# Patient Record
Sex: FEMALE | Race: WHITE | Employment: PART TIME | URBAN - METROPOLITAN AREA
[De-identification: names, ages, dates, MRNs, and addresses within clinical notes are randomized per-mention and may not be internally consistent; named-entity substitution may affect disease eponyms.]

---

## 2017-01-12 PROBLEM — M54.2 NECK PAIN: Status: ACTIVE | Noted: 2017-01-12

## 2018-03-13 ENCOUNTER — HOSPITAL ENCOUNTER (OUTPATIENT)
Dept: GENERAL RADIOLOGY | Age: 58
Discharge: HOME | End: 2018-03-15
Payer: COMMERCIAL

## 2018-03-13 ENCOUNTER — HOSPITAL ENCOUNTER (OUTPATIENT)
Age: 58
End: 2018-03-13
Payer: COMMERCIAL

## 2018-03-13 DIAGNOSIS — M54.2 NECK PAIN: ICD-10-CM

## 2018-03-13 PROCEDURE — 72040 X-RAY EXAM NECK SPINE 2-3 VW: CPT

## 2018-03-14 ENCOUNTER — OFFICE VISIT (OUTPATIENT)
Dept: NEUROSURGERY | Age: 58
End: 2018-03-14
Payer: COMMERCIAL

## 2018-03-14 VITALS
HEIGHT: 65 IN | DIASTOLIC BLOOD PRESSURE: 77 MMHG | HEART RATE: 83 BPM | SYSTOLIC BLOOD PRESSURE: 112 MMHG | WEIGHT: 165 LBS | BODY MASS INDEX: 27.49 KG/M2

## 2018-03-14 DIAGNOSIS — M54.2 NECK PAIN: Primary | ICD-10-CM

## 2018-03-14 PROCEDURE — 3014F SCREEN MAMMO DOC REV: CPT | Performed by: PHYSICIAN ASSISTANT

## 2018-03-14 PROCEDURE — G8484 FLU IMMUNIZE NO ADMIN: HCPCS | Performed by: PHYSICIAN ASSISTANT

## 2018-03-14 PROCEDURE — 3017F COLORECTAL CA SCREEN DOC REV: CPT | Performed by: PHYSICIAN ASSISTANT

## 2018-03-14 PROCEDURE — 99213 OFFICE O/P EST LOW 20 MIN: CPT | Performed by: PHYSICIAN ASSISTANT

## 2018-03-14 PROCEDURE — A4206 1 CC STERILE SYRINGE&NEEDLE: HCPCS | Performed by: PHYSICIAN ASSISTANT

## 2018-03-14 PROCEDURE — G8427 DOCREV CUR MEDS BY ELIG CLIN: HCPCS | Performed by: PHYSICIAN ASSISTANT

## 2018-03-14 PROCEDURE — G8419 CALC BMI OUT NRM PARAM NOF/U: HCPCS | Performed by: PHYSICIAN ASSISTANT

## 2018-03-14 PROCEDURE — 1036F TOBACCO NON-USER: CPT | Performed by: PHYSICIAN ASSISTANT

## 2018-03-14 NOTE — PROGRESS NOTES
Post-Operative Follow-up    This is a 56year old white female who presents to the office for a 1 year visit s/p C5-C6 ACDF. Subjective: Patient states she is \"great\". Denies neck pain. Some left hand weakness, much improvement. Participating in yoga. No acute issues. No gait issues. No family present. Physical Exam:   WDWN, no apparent distress   Non-labored breathing    Vitals Stable   A&O x 3, normal affect    FC x 4 ext   Pupils equal in size   EOMI   5/5 in UE bilaterally   5/5 in LE bilaterally   Sensation to light touch intact bilaterally   Wound clean and dry    Assessment/Plan: Patient s/p C5-C6 ACDF. Stable. 1. X-rays reviewed. 2. Activities as tolerated. 3. Continue exercise routine. 4. RTC PRN, call with questions/issues.

## 2018-05-18 ENCOUNTER — HOSPITAL ENCOUNTER (OUTPATIENT)
Age: 58
Discharge: HOME | End: 2018-05-20
Payer: COMMERCIAL

## 2018-05-18 ENCOUNTER — HOSPITAL ENCOUNTER (OUTPATIENT)
Dept: GENERAL RADIOLOGY | Age: 58
Discharge: HOME | End: 2018-05-20
Payer: COMMERCIAL

## 2018-05-18 DIAGNOSIS — M25.50 PAIN IN JOINT, MULTIPLE SITES: ICD-10-CM

## 2018-05-18 PROCEDURE — 73502 X-RAY EXAM HIP UNI 2-3 VIEWS: CPT

## 2018-05-26 ENCOUNTER — HOSPITAL ENCOUNTER (OUTPATIENT)
Dept: MRI IMAGING | Age: 58
Discharge: HOME | End: 2018-05-28
Payer: COMMERCIAL

## 2018-05-26 DIAGNOSIS — M54.89 BACK PAIN DUE TO INFLAMMATORY PROCESS: ICD-10-CM

## 2018-05-26 DIAGNOSIS — M25.551 RIGHT HIP PAIN: ICD-10-CM

## 2018-05-26 DIAGNOSIS — M19.90 ARTHRITIS: ICD-10-CM

## 2018-05-26 PROCEDURE — 72148 MRI LUMBAR SPINE W/O DYE: CPT

## 2018-06-06 ENCOUNTER — HOSPITAL ENCOUNTER (OUTPATIENT)
Dept: MRI IMAGING | Age: 58
Discharge: HOME | End: 2018-06-08
Payer: COMMERCIAL

## 2018-06-06 DIAGNOSIS — M76.21: ICD-10-CM

## 2018-06-06 DIAGNOSIS — M76.22: ICD-10-CM

## 2018-06-06 PROCEDURE — 6360000004 HC RX CONTRAST MEDICATION: Performed by: RADIOLOGY

## 2018-06-06 PROCEDURE — A9577 INJ MULTIHANCE: HCPCS | Performed by: RADIOLOGY

## 2018-06-06 PROCEDURE — 72197 MRI PELVIS W/O & W/DYE: CPT

## 2018-06-06 RX ADMIN — GADOBENATE DIMEGLUMINE 15 ML: 529 INJECTION, SOLUTION INTRAVENOUS at 10:58

## 2018-06-14 ENCOUNTER — HOSPITAL ENCOUNTER (OUTPATIENT)
Age: 58
Discharge: HOME | End: 2018-06-16

## 2018-06-14 LAB
APTT: 28.7 SEC (ref 24.5–35.1)
HCT VFR BLD CALC: 40.6 % (ref 34–48)
HEMOGLOBIN: 13.4 G/DL (ref 11.5–15.5)
INR BLD: 0.9
PROTHROMBIN TIME: 10.5 SEC (ref 9.3–12.4)

## 2018-06-14 PROCEDURE — 85730 THROMBOPLASTIN TIME PARTIAL: CPT

## 2018-06-14 PROCEDURE — 85014 HEMATOCRIT: CPT

## 2018-06-14 PROCEDURE — 85018 HEMOGLOBIN: CPT

## 2018-06-14 PROCEDURE — 85610 PROTHROMBIN TIME: CPT

## 2018-06-18 ENCOUNTER — HOSPITAL ENCOUNTER (OUTPATIENT)
Age: 58
Discharge: HOME | End: 2018-06-20

## 2018-06-18 PROCEDURE — 88173 CYTOPATH EVAL FNA REPORT: CPT

## 2018-06-18 PROCEDURE — 88305 TISSUE EXAM BY PATHOLOGIST: CPT

## 2018-07-19 RX ORDER — SODIUM CHLORIDE 0.9 % (FLUSH) 0.9 %
10 SYRINGE (ML) INJECTION PRN
Status: CANCELLED | OUTPATIENT
Start: 2018-07-19 | End: 2019-07-19

## 2018-07-19 RX ORDER — NITROGLYCERIN 0.4 MG/1
0.4 TABLET SUBLINGUAL ONCE
Status: CANCELLED | OUTPATIENT
Start: 2018-07-23

## 2018-07-23 ENCOUNTER — HOSPITAL ENCOUNTER (OUTPATIENT)
Dept: CT IMAGING | Age: 58
Discharge: HOME | End: 2018-07-25
Payer: COMMERCIAL

## 2018-07-23 VITALS
RESPIRATION RATE: 18 BRPM | TEMPERATURE: 98.1 F | BODY MASS INDEX: 25.83 KG/M2 | DIASTOLIC BLOOD PRESSURE: 80 MMHG | OXYGEN SATURATION: 100 % | HEART RATE: 76 BPM | HEIGHT: 65 IN | WEIGHT: 155 LBS | SYSTOLIC BLOOD PRESSURE: 126 MMHG

## 2018-07-23 DIAGNOSIS — R07.9 CHEST PAIN, UNSPECIFIED TYPE: ICD-10-CM

## 2018-07-23 DIAGNOSIS — Z82.49 FAMILY HISTORY OF ISCHEMIC HEART DISEASE: ICD-10-CM

## 2018-07-23 DIAGNOSIS — R06.00 DYSPNEA, UNSPECIFIED TYPE: ICD-10-CM

## 2018-07-23 PROCEDURE — 2500000003 HC RX 250 WO HCPCS: Performed by: FAMILY MEDICINE

## 2018-07-23 PROCEDURE — 75574 CT ANGIO HRT W/3D IMAGE: CPT

## 2018-07-23 PROCEDURE — 6360000004 HC RX CONTRAST MEDICATION: Performed by: RADIOLOGY

## 2018-07-23 PROCEDURE — 6370000000 HC RX 637 (ALT 250 FOR IP): Performed by: FAMILY MEDICINE

## 2018-07-23 PROCEDURE — 6370000000 HC RX 637 (ALT 250 FOR IP): Performed by: RADIOLOGY

## 2018-07-23 PROCEDURE — 7100000010 HC PHASE II RECOVERY - FIRST 15 MIN

## 2018-07-23 PROCEDURE — 7100000011 HC PHASE II RECOVERY - ADDTL 15 MIN

## 2018-07-23 RX ORDER — NITROGLYCERIN 0.4 MG/1
0.4 TABLET SUBLINGUAL ONCE
Status: COMPLETED | OUTPATIENT
Start: 2018-07-23 | End: 2018-07-23

## 2018-07-23 RX ORDER — METOPROLOL TARTRATE 50 MG/1
50 TABLET, FILM COATED ORAL ONCE
Status: COMPLETED | OUTPATIENT
Start: 2018-07-23 | End: 2018-07-23

## 2018-07-23 RX ORDER — SODIUM CHLORIDE 0.9 % (FLUSH) 0.9 %
10 SYRINGE (ML) INJECTION PRN
Status: DISCONTINUED | OUTPATIENT
Start: 2018-07-23 | End: 2018-07-26 | Stop reason: HOSPADM

## 2018-07-23 RX ORDER — METOPROLOL TARTRATE 5 MG/5ML
5 INJECTION INTRAVENOUS EVERY 5 MIN PRN
Status: DISCONTINUED | OUTPATIENT
Start: 2018-07-23 | End: 2018-07-26 | Stop reason: HOSPADM

## 2018-07-23 RX ADMIN — NITROGLYCERIN 0.4 MG: 0.4 TABLET SUBLINGUAL at 10:55

## 2018-07-23 RX ADMIN — METOPROLOL TARTRATE 50 MG: 50 TABLET ORAL at 09:07

## 2018-07-23 RX ADMIN — IOPAMIDOL 100 ML: 755 INJECTION, SOLUTION INTRAVENOUS at 10:45

## 2018-07-23 RX ADMIN — METOROPROLOL TARTRATE 5 MG: 5 INJECTION, SOLUTION INTRAVENOUS at 10:17

## 2018-07-23 RX ADMIN — METOROPROLOL TARTRATE 5 MG: 5 INJECTION, SOLUTION INTRAVENOUS at 11:00

## 2018-07-23 ASSESSMENT — PAIN - FUNCTIONAL ASSESSMENT: PAIN_FUNCTIONAL_ASSESSMENT: 0-10

## 2018-07-23 NOTE — PROGRESS NOTES
0745 Received pt ambulatory to angio recovery room. Vital signs  obtained  0825 RAC 18 g Introcan inserted by charge  Tech  1799 Hand off report given to Rc 555  1035 Returned to angio recovery room. Taking PO fluids  1130 RAC 18 bg Introcan removed intact.  DSD applied  1502 Discharge via wheel chair to car, in stable condition

## 2020-06-18 ENCOUNTER — OFFICE VISIT (OUTPATIENT)
Dept: PRIMARY CARE CLINIC | Age: 60
End: 2020-06-18
Payer: COMMERCIAL

## 2020-06-18 VITALS
HEIGHT: 64 IN | BODY MASS INDEX: 26.8 KG/M2 | DIASTOLIC BLOOD PRESSURE: 85 MMHG | SYSTOLIC BLOOD PRESSURE: 142 MMHG | WEIGHT: 157 LBS | TEMPERATURE: 98.8 F

## 2020-06-18 PROBLEM — M79.10 MYALGIA: Status: ACTIVE | Noted: 2020-06-18

## 2020-06-18 PROBLEM — D68.51 FACTOR V LEIDEN (HCC): Chronic | Status: ACTIVE | Noted: 2020-06-18

## 2020-06-18 PROBLEM — M79.10 MYALGIA: Chronic | Status: ACTIVE | Noted: 2020-06-18

## 2020-06-18 PROBLEM — I10 ESSENTIAL HYPERTENSION: Status: ACTIVE | Noted: 2020-06-18

## 2020-06-18 PROBLEM — E78.2 MIXED HYPERLIPIDEMIA: Status: ACTIVE | Noted: 2020-06-18

## 2020-06-18 PROBLEM — I10 ESSENTIAL HYPERTENSION: Chronic | Status: ACTIVE | Noted: 2020-06-18

## 2020-06-18 PROBLEM — M54.2 NECK PAIN: Chronic | Status: ACTIVE | Noted: 2017-01-12

## 2020-06-18 PROBLEM — E78.2 MIXED HYPERLIPIDEMIA: Chronic | Status: ACTIVE | Noted: 2020-06-18

## 2020-06-18 PROBLEM — D68.51 FACTOR V LEIDEN (HCC): Status: ACTIVE | Noted: 2020-06-18

## 2020-06-18 PROCEDURE — 99204 OFFICE O/P NEW MOD 45 MIN: CPT | Performed by: FAMILY MEDICINE

## 2020-06-18 ASSESSMENT — ENCOUNTER SYMPTOMS
EYES NEGATIVE: 1
RESPIRATORY NEGATIVE: 1
ALLERGIC/IMMUNOLOGIC NEGATIVE: 1
GASTROINTESTINAL NEGATIVE: 1

## 2020-06-18 ASSESSMENT — PATIENT HEALTH QUESTIONNAIRE - PHQ9
SUM OF ALL RESPONSES TO PHQ QUESTIONS 1-9: 0
1. LITTLE INTEREST OR PLEASURE IN DOING THINGS: 0
2. FEELING DOWN, DEPRESSED OR HOPELESS: 0
SUM OF ALL RESPONSES TO PHQ9 QUESTIONS 1 & 2: 0
SUM OF ALL RESPONSES TO PHQ QUESTIONS 1-9: 0

## 2020-06-25 ENCOUNTER — HOSPITAL ENCOUNTER (OUTPATIENT)
Age: 60
Discharge: HOME | End: 2020-06-27
Payer: COMMERCIAL

## 2020-06-25 ENCOUNTER — HOSPITAL ENCOUNTER (OUTPATIENT)
Age: 60
Discharge: HOME | End: 2020-06-25
Payer: COMMERCIAL

## 2020-06-25 LAB
ALBUMIN SERPL-MCNC: 4.7 G/DL (ref 3.5–5.2)
ALP BLD-CCNC: 44 U/L (ref 35–104)
ALT SERPL-CCNC: 20 U/L (ref 0–32)
ANION GAP SERPL CALCULATED.3IONS-SCNC: 10 MMOL/L (ref 7–16)
AST SERPL-CCNC: 24 U/L (ref 0–31)
BACTERIA: NORMAL /HPF
BASOPHILS ABSOLUTE: 0.05 E9/L (ref 0–0.2)
BASOPHILS RELATIVE PERCENT: 1.1 % (ref 0–2)
BILIRUB SERPL-MCNC: 0.4 MG/DL (ref 0–1.2)
BILIRUBIN URINE: NEGATIVE
BLOOD, URINE: NORMAL
BUN BLDV-MCNC: 14 MG/DL (ref 6–20)
C-REACTIVE PROTEIN, HIGH SENSITIVITY: <0.3 MG/L (ref 0–3)
C-REACTIVE PROTEIN: <0.1 MG/DL (ref 0–0.4)
CALCIUM SERPL-MCNC: 9.6 MG/DL (ref 8.6–10.2)
CHLORIDE BLD-SCNC: 100 MMOL/L (ref 98–107)
CHOLESTEROL, TOTAL: 270 MG/DL (ref 0–199)
CLARITY: CLEAR
CO2: 24 MMOL/L (ref 22–29)
COLOR: YELLOW
CREAT SERPL-MCNC: 0.7 MG/DL (ref 0.5–1)
EOSINOPHILS ABSOLUTE: 0.29 E9/L (ref 0.05–0.5)
EOSINOPHILS RELATIVE PERCENT: 6.5 % (ref 0–6)
EPITHELIAL CELLS, UA: NORMAL /HPF
GFR AFRICAN AMERICAN: >60
GFR NON-AFRICAN AMERICAN: >60 ML/MIN/1.73
GLUCOSE BLD-MCNC: 83 MG/DL (ref 74–99)
GLUCOSE URINE: NEGATIVE MG/DL
HCT VFR BLD CALC: 41.2 % (ref 34–48)
HDLC SERPL-MCNC: 93 MG/DL
HEMOGLOBIN: 13.4 G/DL (ref 11.5–15.5)
IMMATURE GRANULOCYTES #: 0.01 E9/L
IMMATURE GRANULOCYTES %: 0.2 % (ref 0–5)
KETONES, URINE: NEGATIVE MG/DL
LDL CHOLESTEROL CALCULATED: 162 MG/DL (ref 0–99)
LEUKOCYTE ESTERASE, URINE: NEGATIVE
LYMPHOCYTES ABSOLUTE: 0.96 E9/L (ref 1.5–4)
LYMPHOCYTES RELATIVE PERCENT: 21.6 % (ref 20–42)
MCH RBC QN AUTO: 30.6 PG (ref 26–35)
MCHC RBC AUTO-ENTMCNC: 32.5 % (ref 32–34.5)
MCV RBC AUTO: 94.1 FL (ref 80–99.9)
MONOCYTES ABSOLUTE: 0.26 E9/L (ref 0.1–0.95)
MONOCYTES RELATIVE PERCENT: 5.9 % (ref 2–12)
NEUTROPHILS ABSOLUTE: 2.87 E9/L (ref 1.8–7.3)
NEUTROPHILS RELATIVE PERCENT: 64.7 % (ref 43–80)
NITRITE, URINE: NEGATIVE
PDW BLD-RTO: 12.9 FL (ref 11.5–15)
PH UA: 6 (ref 5–9)
PLATELET # BLD: 193 E9/L (ref 130–450)
PMV BLD AUTO: 11.2 FL (ref 7–12)
POTASSIUM SERPL-SCNC: 4.3 MMOL/L (ref 3.5–5)
PROTEIN UA: NEGATIVE MG/DL
RBC # BLD: 4.38 E12/L (ref 3.5–5.5)
RBC UA: NORMAL /HPF (ref 0–2)
RHEUMATOID FACTOR: 10 IU/ML (ref 0–13)
SEDIMENTATION RATE, ERYTHROCYTE: 2 MM/HR (ref 0–20)
SODIUM BLD-SCNC: 134 MMOL/L (ref 132–146)
SPECIFIC GRAVITY UA: <=1.005 (ref 1–1.03)
T4 TOTAL: 7.4 MCG/DL (ref 4.5–11.7)
TOTAL PROTEIN: 8 G/DL (ref 6.4–8.3)
TRIGL SERPL-MCNC: 77 MG/DL (ref 0–149)
TSH SERPL DL<=0.05 MIU/L-ACNC: 2.05 UIU/ML (ref 0.27–4.2)
UROBILINOGEN, URINE: 0.2 E.U./DL
VITAMIN D 25-HYDROXY: 49 NG/ML (ref 30–100)
VLDLC SERPL CALC-MCNC: 15 MG/DL
WBC # BLD: 4.4 E9/L (ref 4.5–11.5)
WBC UA: NORMAL /HPF (ref 0–5)

## 2020-06-25 PROCEDURE — 86141 C-REACTIVE PROTEIN HS: CPT

## 2020-06-25 PROCEDURE — 86431 RHEUMATOID FACTOR QUANT: CPT

## 2020-06-25 PROCEDURE — 80053 COMPREHEN METABOLIC PANEL: CPT

## 2020-06-25 PROCEDURE — 81241 F5 GENE: CPT

## 2020-06-25 PROCEDURE — 36415 COLL VENOUS BLD VENIPUNCTURE: CPT

## 2020-06-25 PROCEDURE — 80061 LIPID PANEL: CPT

## 2020-06-25 PROCEDURE — 84436 ASSAY OF TOTAL THYROXINE: CPT

## 2020-06-25 PROCEDURE — 85025 COMPLETE CBC W/AUTO DIFF WBC: CPT

## 2020-06-25 PROCEDURE — 81001 URINALYSIS AUTO W/SCOPE: CPT

## 2020-06-25 PROCEDURE — 86038 ANTINUCLEAR ANTIBODIES: CPT

## 2020-06-25 PROCEDURE — 86140 C-REACTIVE PROTEIN: CPT

## 2020-06-25 PROCEDURE — 82306 VITAMIN D 25 HYDROXY: CPT

## 2020-06-25 PROCEDURE — 85651 RBC SED RATE NONAUTOMATED: CPT

## 2020-06-25 PROCEDURE — 84443 ASSAY THYROID STIM HORMONE: CPT

## 2020-06-26 LAB — ANTI-NUCLEAR ANTIBODY (ANA): NEGATIVE

## 2020-07-01 LAB
FACTOR V LEIDEN: NEGATIVE
SPECIMEN: NORMAL

## 2020-07-07 ENCOUNTER — OFFICE VISIT (OUTPATIENT)
Dept: PRIMARY CARE CLINIC | Age: 60
End: 2020-07-07
Payer: COMMERCIAL

## 2020-07-07 VITALS
HEIGHT: 64 IN | BODY MASS INDEX: 26.63 KG/M2 | DIASTOLIC BLOOD PRESSURE: 74 MMHG | SYSTOLIC BLOOD PRESSURE: 128 MMHG | TEMPERATURE: 98.4 F | WEIGHT: 156 LBS

## 2020-07-07 PROCEDURE — 93000 ELECTROCARDIOGRAM COMPLETE: CPT | Performed by: FAMILY MEDICINE

## 2020-07-07 PROCEDURE — 99396 PREV VISIT EST AGE 40-64: CPT | Performed by: FAMILY MEDICINE

## 2020-07-07 ASSESSMENT — ENCOUNTER SYMPTOMS
ALLERGIC/IMMUNOLOGIC NEGATIVE: 1
EYES NEGATIVE: 1
GASTROINTESTINAL NEGATIVE: 1
RESPIRATORY NEGATIVE: 1

## 2020-07-07 NOTE — PROGRESS NOTES
20  Name: Wellington Gosselin Deabrayan    : 1960    Sex: female    Age: 64 y.o. Subjective:  Chief Complaint: Patient is here for 2-week checkup after her first visit. To review lab. Feels well. No chest pain or shortness of breath. Cholesterol very high at 270 but she has a HDL of 93. Her factor V Leyden test was negative. Her inflammation markers were negative. bp home   111-70------110-71    She thinks  Last chol  Was  Around 270 as well      Review of Systems   Constitutional: Negative. HENT: Negative. Eyes: Negative. Respiratory: Negative. Cardiovascular: Negative. Gastrointestinal: Negative. Endocrine: Negative. Genitourinary: Negative. Musculoskeletal: Negative. Skin: Negative. Allergic/Immunologic: Negative. Neurological: Negative. Hematological: Negative. Psychiatric/Behavioral: Negative.           Current Outpatient Medications:     Omega-3 Fatty Acids (FISH OIL OMEGA-3 PO), Take 1 capsule by mouth, Disp: , Rfl:     aspirin 81 MG tablet, Take 81 mg by mouth daily, Disp: , Rfl:     vitamin D (CHOLECALCIFEROL) 1000 UNITS TABS tablet, Take 1,000 Units by mouth daily, Disp: , Rfl:   No Known Allergies  Social History     Socioeconomic History    Marital status:      Spouse name: Not on file    Number of children: Not on file    Years of education: Not on file    Highest education level: Not on file   Occupational History    Not on file   Social Needs    Financial resource strain: Not on file    Food insecurity     Worry: Not on file     Inability: Not on file    Transportation needs     Medical: Not on file     Non-medical: Not on file   Tobacco Use    Smoking status: Never Smoker    Smokeless tobacco: Never Used   Substance and Sexual Activity    Alcohol use: Yes     Comment: socially    Drug use: No    Sexual activity: Not on file   Lifestyle    Physical activity     Days per week: Not on file     Minutes per session: Not on file    Stress: Not on file   Relationships    Social connections     Talks on phone: Not on file     Gets together: Not on file     Attends [employer] service: Not on file     Active member of club or organization: Not on file     Attends meetings of clubs or organizations: Not on file     Relationship status: Not on file    Intimate partner violence     Fear of current or ex partner: Not on file     Emotionally abused: Not on file     Physically abused: Not on file     Forced sexual activity: Not on file   Other Topics Concern    Not on file   Social History Narrative    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   is a  at [employer]- boy 29 girl 34 girl 21    Father  from CHF and Parkinson's disease. Devonda Minium history of CAD and a brother who had 2 myocardial infarction as before 61    sis. Con Irvin    Hyperlipidemia    Elevated blood pressure 6-20    Chronic low back pain with lumbar disc into the right leg---mri   2018  With  djd  And   Lesion with neg bone scan at [facility]    Cervical disc surgery 2016 with     Uterine ablation    Myalgias      Past Medical History:   Diagnosis Date    Factor V Leiden ([address]     on folic acid & baby asa; follows with PCP    Screening for colon cancer      No family history on file. Past Surgical History:   Procedure Laterality Date    CERVICAL DISCECTOMY  2017    anterior cervical discectomy and fusion C5-C6 with Dr. Chandler Shell:    20 0753   BP: 128/74   Temp: 98.4 °F (36.9 °C)   Weight: 156 lb (70.8 kg)   Height: 5' 4\" (1.626 m)       Objective:    Physical Exam  Vitals signs reviewed. Constitutional:       Appearance: She is well-developed. HENT:      Head: Normocephalic. Eyes:      Pupils: Pupils are equal, round, and reactive to light. Neck:      Musculoskeletal: Normal range of motion. Cardiovascular:      Rate and Rhythm: Normal rate and regular rhythm. Pulmonary:      Effort: Pulmonary effort is normal.      Breath sounds: Normal breath sounds. Abdominal:      Palpations: Abdomen is soft. Musculoskeletal: Normal range of motion. Skin:     General: Skin is warm. Neurological:      Mental Status: She is alert and oriented to person, place, and time. Psychiatric:         Behavior: Behavior normal.         Oj Mar was seen today for discuss labs. Diagnoses and all orders for this visit:    Encounter for annual general medical examination with abnormal findings in adult    Essential hypertension  -     EKG 12 lead; Future  -     EKG 12 lead    Mixed hyperlipidemia  -     Lipid Panel; Future        Comments: I tnink risk low but  With her family history should beon a statin and pt refuss  She ware of risk  Shew  Wants  Lipid in 6 mo       She will do  Fish oil  A great deal of time spent reviewing medications, diet, exercise, social issues. Also reviewing the chart before entering the room with patient and finishing charting after leaving patient's room. More than half of that time was spent face to face with the patient in counseling and coordinating care. Lipid 6 mo  And see me after     takeing  Red yeast rice and  Fish oil    Follow Up: Return in about 6 months (around 1/7/2021) for lab  before.      Seen by:  Rosa Marquez DO

## 2021-10-22 ENCOUNTER — TELEPHONE (OUTPATIENT)
Dept: BREAST CENTER | Age: 61
End: 2021-10-22

## 2021-10-22 NOTE — TELEPHONE ENCOUNTER
Patient is a new referral to the breast clinic. Left message for patient to return call at [phone] to discuss referral information and schedule an appointment in the breast clinic.

## 2021-10-25 ENCOUNTER — OFFICE VISIT (OUTPATIENT)
Dept: PRIMARY CARE CLINIC | Age: 61
End: 2021-10-25
Payer: COMMERCIAL

## 2021-10-25 VITALS
TEMPERATURE: 98.6 F | HEIGHT: 64 IN | WEIGHT: 158 LBS | DIASTOLIC BLOOD PRESSURE: 82 MMHG | BODY MASS INDEX: 26.98 KG/M2 | SYSTOLIC BLOOD PRESSURE: 142 MMHG

## 2021-10-25 DIAGNOSIS — E03.9 ACQUIRED HYPOTHYROIDISM: ICD-10-CM

## 2021-10-25 DIAGNOSIS — Z12.11 SCREEN FOR COLON CANCER: ICD-10-CM

## 2021-10-25 DIAGNOSIS — M79.10 MYALGIA: Chronic | ICD-10-CM

## 2021-10-25 DIAGNOSIS — E78.2 MIXED HYPERLIPIDEMIA: Chronic | ICD-10-CM

## 2021-10-25 DIAGNOSIS — N63.0 BREAST MASS: Primary | ICD-10-CM

## 2021-10-25 DIAGNOSIS — M81.0 AGE-RELATED OSTEOPOROSIS WITHOUT CURRENT PATHOLOGICAL FRACTURE: ICD-10-CM

## 2021-10-25 DIAGNOSIS — Z00.01 ENCOUNTER FOR ANNUAL GENERAL MEDICAL EXAMINATION WITH ABNORMAL FINDINGS IN ADULT: Primary | ICD-10-CM

## 2021-10-25 PROCEDURE — G8484 FLU IMMUNIZE NO ADMIN: HCPCS | Performed by: FAMILY MEDICINE

## 2021-10-25 PROCEDURE — 99396 PREV VISIT EST AGE 40-64: CPT | Performed by: FAMILY MEDICINE

## 2021-10-25 ASSESSMENT — PATIENT HEALTH QUESTIONNAIRE - PHQ9
2. FEELING DOWN, DEPRESSED OR HOPELESS: 0
SUM OF ALL RESPONSES TO PHQ QUESTIONS 1-9: 0
SUM OF ALL RESPONSES TO PHQ QUESTIONS 1-9: 0
SUM OF ALL RESPONSES TO PHQ9 QUESTIONS 1 & 2: 0
1. LITTLE INTEREST OR PLEASURE IN DOING THINGS: 0
SUM OF ALL RESPONSES TO PHQ QUESTIONS 1-9: 0

## 2021-10-25 ASSESSMENT — ENCOUNTER SYMPTOMS
RESPIRATORY NEGATIVE: 1
ALLERGIC/IMMUNOLOGIC NEGATIVE: 1
GASTROINTESTINAL NEGATIVE: 1
EYES NEGATIVE: 1

## 2021-10-25 NOTE — PROGRESS NOTES
10/25/21  Name: Phillip Feliz    : 1960    Sex: female    Age: 63 y.o. Subjective:  Chief Complaint: Patient is here for yearly wellenss ck and chol      Was due    6 mo after  7-20     faield  Mom    Last nov     Wt  Same        No cp ros b  Did nto get lab done yet    bp  Great at home  116-78    Nc myaglia   She wants  Lyme   Test done      And  Jeanne again    Good cahnce we will start stain next ov          Review of Systems   Constitutional: Negative. HENT: Negative. Eyes: Negative. Respiratory: Negative. Cardiovascular: Negative. Gastrointestinal: Negative. Endocrine: Negative. Genitourinary: Negative. Musculoskeletal: Negative. Skin: Negative. Allergic/Immunologic: Negative. Neurological: Negative. Hematological: Negative. Psychiatric/Behavioral: Negative. Current Outpatient Medications:     Red Yeast Rice Extract (RED YEAST RICE PO), Take by mouth, Disp: , Rfl:     Omega-3 Fatty Acids (FISH OIL OMEGA-3 PO), Take 1 capsule by mouth, Disp: , Rfl:     vitamin D (CHOLECALCIFEROL) 1000 UNITS TABS tablet, Take 1,000 Units by mouth daily, Disp: , Rfl:   No Known Allergies  Social History     Socioeconomic History    Marital status:      Spouse name: Not on file    Number of children: Not on file    Years of education: Not on file    Highest education level: Not on file   Occupational History    Not on file   Tobacco Use    Smoking status: Never Smoker    Smokeless tobacco: Never Used   Substance and Sexual Activity    Alcohol use: Yes     Comment: socially    Drug use: No    Sexual activity: Not on file   Other Topics Concern    Not on file   Social History Narrative    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       is a  at [employer]- boy 29 girl 34 girl 20---------------------------one nabeel moved to [facility]      10-21    Father  from CHF and Parkinson's disease. Family history of CAD and a brother who had 2 myocardial infarction as before 61    sis. [place] Singh    Hyperlipidemia    Elevated blood pressure 6-20--in office    great at home     white coat HTN    Chronic low back pain with lumbar disc into the right leg---mri   2018  With  tc  And   Lesion with neg bone scan at [facility]    Cervical disc surgery 2016 with     Uterine ablation    Myalgias    Elev Chol with high  hdl  7-20  due 6 mo and failed    Mom    11-  at  80 yrs old    Colon   age 52--due ten yrs     Social Determinants of Health     Financial Resource Strain:     Difficulty of Paying Living Expenses:    Food Insecurity:     Worried About Running Out of Food in the Last Year:     Ran Out of Food in the Last Year:    Transportation Needs:     Lack of Transportation (Medical):  Lack of Transportation (Non-Medical):    Physical Activity:     Days of Exercise per Week:     Minutes of Exercise per Session:    Stress:     Feeling of Stress :    Social Connections:     Frequency of Communication with Friends and Family:     Frequency of Social Gatherings with Friends and Family:     Attends Latter-[employer] Services:     Active Member of Clubs or Organizations:     Attends Club or Organization Meetings:     Marital Status:    Intimate Partner Violence:     Fear of Current or Ex-Partner:     Emotionally Abused:     Physically Abused:     Sexually Abused:       Past Medical History:   Diagnosis Date    Factor V Leiden ([address]     on folic acid & baby asa; follows with PCP    Screening for colon cancer      No family history on file.    Past Surgical History:   Procedure Laterality Date    CERVICAL DISCECTOMY  2017    anterior cervical discectomy and fusion C5-C6 with Dr. Robina Andrews:    10/25/21 0745   BP: (!) 142/82   Temp: 98.6 °F (37 °C)   TempSrc: Oral   Weight: 158 lb (71.7 kg)   Height: 5' 4\" (1.626 m)       Objective:    Physical Exam  Vitals reviewed. Constitutional:       Appearance: She is well-developed. HENT:      Head: Normocephalic. Eyes:      Pupils: Pupils are equal, round, and reactive to light. Cardiovascular:      Rate and Rhythm: Normal rate and regular rhythm. Pulmonary:      Effort: Pulmonary effort is normal.      Breath sounds: Normal breath sounds. Abdominal:      Palpations: Abdomen is soft. Musculoskeletal:         General: Normal range of motion. Cervical back: Normal range of motion. Skin:     General: Skin is warm. Neurological:      Mental Status: She is alert and oriented to person, place, and time. Psychiatric:         Behavior: Behavior normal.         Herman Garcia was seen today for annual exam.    Diagnoses and all orders for this visit:    Encounter for annual general medical examination with abnormal findings in adult    Mixed hyperlipidemia    Myalgia    Screen for colon cancer  -     Rekha (For External Results Only); Future        Comments: lab  An dse ana laura after      lwo fat diet she reuest   Jeanne and      Lyme titer   She takes aleve    At hs      Aggressive low-fat diet. Avoid red meats, greasy fried foods, dairy products. Avoid processed foods. Take cholesterol medications without food. A great deal of time spent reviewing medications, diet, exercise, social issues. Also reviewing the chart before entering the room with patient and finishing charting after leaving patient's room. More than half of that time was spent face to face with the patient in counseling and coordinating care. Follow Up: Return in about 2 weeks (around 11/8/2021) for Lab Before.      Seen by:  Millicent Lopez DO

## 2021-10-27 ENCOUNTER — HOSPITAL ENCOUNTER (OUTPATIENT)
Age: 61
Discharge: HOME | End: 2021-10-27
Payer: COMMERCIAL

## 2021-10-27 DIAGNOSIS — E03.9 ACQUIRED HYPOTHYROIDISM: ICD-10-CM

## 2021-10-27 DIAGNOSIS — Z12.11 SCREEN FOR COLON CANCER: ICD-10-CM

## 2021-10-27 DIAGNOSIS — M81.0 AGE-RELATED OSTEOPOROSIS WITHOUT CURRENT PATHOLOGICAL FRACTURE: ICD-10-CM

## 2021-10-27 DIAGNOSIS — M79.10 MYALGIA: Chronic | ICD-10-CM

## 2021-10-27 DIAGNOSIS — Z00.01 ENCOUNTER FOR ANNUAL GENERAL MEDICAL EXAMINATION WITH ABNORMAL FINDINGS IN ADULT: ICD-10-CM

## 2021-10-27 LAB
ALBUMIN SERPL-MCNC: 5.1 G/DL (ref 3.5–5.2)
ALP BLD-CCNC: 46 U/L (ref 35–104)
ALT SERPL-CCNC: 15 U/L (ref 0–32)
ANION GAP SERPL CALCULATED.3IONS-SCNC: 14 MMOL/L (ref 7–16)
AST SERPL-CCNC: 23 U/L (ref 0–31)
BASOPHILS ABSOLUTE: 0.04 E9/L (ref 0–0.2)
BASOPHILS RELATIVE PERCENT: 0.9 % (ref 0–2)
BILIRUB SERPL-MCNC: 0.6 MG/DL (ref 0–1.2)
BILIRUBIN URINE: NEGATIVE
BLOOD, URINE: NEGATIVE
BUN BLDV-MCNC: 14 MG/DL (ref 6–20)
CALCIUM SERPL-MCNC: 10.1 MG/DL (ref 8.6–10.2)
CHLORIDE BLD-SCNC: 99 MMOL/L (ref 98–107)
CHOLESTEROL, TOTAL: 267 MG/DL (ref 0–199)
CLARITY: CLEAR
CO2: 23 MMOL/L (ref 22–29)
COLOR: YELLOW
CREAT SERPL-MCNC: 0.7 MG/DL (ref 0.5–1)
EOSINOPHILS ABSOLUTE: 0.23 E9/L (ref 0.05–0.5)
EOSINOPHILS RELATIVE PERCENT: 5.4 % (ref 0–6)
GFR AFRICAN AMERICAN: >60
GFR NON-AFRICAN AMERICAN: >60 ML/MIN/1.73
GLUCOSE BLD-MCNC: 93 MG/DL (ref 74–99)
GLUCOSE URINE: NEGATIVE MG/DL
HCT VFR BLD CALC: 43.3 % (ref 34–48)
HDLC SERPL-MCNC: 98 MG/DL
HEMOGLOBIN: 14.3 G/DL (ref 11.5–15.5)
IMMATURE GRANULOCYTES #: 0.01 E9/L
IMMATURE GRANULOCYTES %: 0.2 % (ref 0–5)
KETONES, URINE: NEGATIVE MG/DL
LDL CHOLESTEROL CALCULATED: 154 MG/DL (ref 0–99)
LEUKOCYTE ESTERASE, URINE: NEGATIVE
LYMPHOCYTES ABSOLUTE: 1.4 E9/L (ref 1.5–4)
LYMPHOCYTES RELATIVE PERCENT: 32.9 % (ref 20–42)
MCH RBC QN AUTO: 31 PG (ref 26–35)
MCHC RBC AUTO-ENTMCNC: 33 % (ref 32–34.5)
MCV RBC AUTO: 93.7 FL (ref 80–99.9)
MONOCYTES ABSOLUTE: 0.3 E9/L (ref 0.1–0.95)
MONOCYTES RELATIVE PERCENT: 7 % (ref 2–12)
NEUTROPHILS ABSOLUTE: 2.28 E9/L (ref 1.8–7.3)
NEUTROPHILS RELATIVE PERCENT: 53.6 % (ref 43–80)
NITRITE, URINE: NEGATIVE
PDW BLD-RTO: 12.4 FL (ref 11.5–15)
PH UA: 6 (ref 5–9)
PLATELET # BLD: 242 E9/L (ref 130–450)
PMV BLD AUTO: 10.9 FL (ref 7–12)
POTASSIUM SERPL-SCNC: 3.8 MMOL/L (ref 3.5–5)
PROTEIN UA: NEGATIVE MG/DL
RBC # BLD: 4.62 E12/L (ref 3.5–5.5)
RHEUMATOID FACTOR: <10 IU/ML (ref 0–13)
SEDIMENTATION RATE, ERYTHROCYTE: 9 MM/HR (ref 0–20)
SODIUM BLD-SCNC: 136 MMOL/L (ref 132–146)
SPECIFIC GRAVITY UA: <=1.005 (ref 1–1.03)
T4 TOTAL: 8.2 MCG/DL (ref 4.5–11.7)
TOTAL PROTEIN: 8.3 G/DL (ref 6.4–8.3)
TRIGL SERPL-MCNC: 74 MG/DL (ref 0–149)
TSH SERPL DL<=0.05 MIU/L-ACNC: 3.42 UIU/ML (ref 0.27–4.2)
UROBILINOGEN, URINE: 0.2 E.U./DL
VITAMIN D 25-HYDROXY: 53 NG/ML (ref 30–100)
VLDLC SERPL CALC-MCNC: 15 MG/DL
WBC # BLD: 4.3 E9/L (ref 4.5–11.5)

## 2021-10-27 PROCEDURE — 85651 RBC SED RATE NONAUTOMATED: CPT

## 2021-10-27 PROCEDURE — 81003 URINALYSIS AUTO W/O SCOPE: CPT

## 2021-10-27 PROCEDURE — 86618 LYME DISEASE ANTIBODY: CPT

## 2021-10-27 PROCEDURE — 36415 COLL VENOUS BLD VENIPUNCTURE: CPT

## 2021-10-27 PROCEDURE — 82306 VITAMIN D 25 HYDROXY: CPT

## 2021-10-27 PROCEDURE — 84443 ASSAY THYROID STIM HORMONE: CPT

## 2021-10-27 PROCEDURE — 80061 LIPID PANEL: CPT

## 2021-10-27 PROCEDURE — 86038 ANTINUCLEAR ANTIBODIES: CPT

## 2021-10-27 PROCEDURE — 85025 COMPLETE CBC W/AUTO DIFF WBC: CPT

## 2021-10-27 PROCEDURE — 80053 COMPREHEN METABOLIC PANEL: CPT

## 2021-10-27 PROCEDURE — 86431 RHEUMATOID FACTOR QUANT: CPT

## 2021-10-27 PROCEDURE — 84436 ASSAY OF TOTAL THYROXINE: CPT

## 2021-10-28 LAB — ANTI-NUCLEAR ANTIBODY (ANA): NEGATIVE

## 2021-10-30 LAB — LYME, EIA: 0.13 LIV (ref 0–1.2)

## 2021-11-11 NOTE — PROGRESS NOTES
Subjective:      Patient ID: Sandra Feliz is a 63 y.o. female. HPI  History and Physical    Patient's Name/Date of Birth: Sandra Feliz / 1960    Date: 2021    Sandra Feliz presents for evaluation of a Left breast lesion    PCP: Dayan Julien DO. Gynecologist:  Dr. Yasir Gonzalez. Allegra Flynn is an extremely pleasant 63 y.o. female who presents for evaluation of a left breast lesion. She reports it was initially noted in the left upper outer quadrant on clinical exam by her gynecology provider, Dr. Amanda Roca. She notes she has been unable to find the lesion on breast self-exam.  She has no other breast related complaints. Denies a personal or family history of breast cancer her risk factors for breast cancer include gender. She denies any family history of cancer. She does note a family history of early cardiac events. Ashkenazi Anabaptist Ancestry: No.    OBSTETRIC RELATED HISTORY:  Age of menarche was 17. Age of menopause was 56. Patient denies hormonal therapy. Patient is . Age of first live birth was 31. Patient did breast feed. Is patient interested in fertility information about fertility preservation? No    CANCER SURVEILLANCE HISTORY:  Mammograms: Yes   Breast MRI's: No   Breast Biopsies: Yes 25 years ago benign  Colonoscopy: Yes   GI Polyps: No   EGD: Yes   Pelvic Exam: Yes   Pap Smear: Yes   Dermatology: No   Lung screening: no    Estimated body mass index is 27.12 kg/m² as calculated from the following:    Height as of 10/25/21: 5' 4\" (1.626 m). Weight as of 10/25/21: 158 lb (71.7 kg). Bra Size: 36 C    Because violence is so common, we ask all our patients: are you in a relationship or do you live with a person who threatens, hurts, or controls you:  denies    Patient drinks moderate caffeinated beverages. Patient does not smoke cigarettes. Patient does not use recreational drugs.       Past Medical History:   Diagnosis Date    [facility] on folic acid & baby asa; follows with PCP    Screening for colon cancer        Past Surgical History:   Procedure Laterality Date    CERVICAL DISCECTOMY  2017    anterior cervical discectomy and fusion C5-C6 with Dr. Jose G Mckeon         Current Outpatient Medications   Medication Sig Dispense Refill    Red Yeast Rice Extract (RED YEAST RICE PO) Take by mouth      Omega-3 Fatty Acids (FISH OIL OMEGA-3 PO) Take 1 capsule by mouth      vitamin D (CHOLECALCIFEROL) 1000 UNITS TABS tablet Take 1,000 Units by mouth daily       No current facility-administered medications for this visit. 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   is a  at [employer]- boy 31 girl 36 girl 20---------------------------one nabeel moved to [employer]      12    Father  from CHF and Parkinson's disease. Family history of CAD and a brother who had 2 myocardial infarction as before 61    sis.  Rogelio Dolan    Hyperlipidemia    Elevated blood pressure 6-20--in office    great at home     white coat HTN    Chronic low back pain with lumbar disc into the right leg---mri   2018  With  djd  And   Lesion [facility]    Cervical disc surgery 2016 with     Uterine ablation    Myalgias    Elev Chol with high  hdl  7-20  due 6 mo and failed    Mom      at  80 yrs old    Colon   age 52--due ten yrs     Social Determinants of Health     Financial Resource Strain:     Difficulty of Paying Living Expenses: Not on file   Food Insecurity:     Worried About Running Out of Food in the Last Year: Not on file    Mariano of Food in the Last Year: Not on file   Transportation Needs:     Lack of Transportation (Medical): Not on file    Lack of Transportation (Non-Medical): Not on file   Physical Activity:     Days of Exercise per Week: Not on file    Minutes of Exercise per Session: Not on file   Stress:     Feeling of Stress : Not on file   Social Connections:     Frequency of Communication with Friends and Family: Not on file    Frequency of Social Gatherings with Friends and Family: Not on file    Attends Mandaeism Services: Not on file    Active Member of [address] [place] [address] [employer] or Organizations: Not on file    Attends Club or Organization Meetings: Not on file    Marital Status: Not on file   Intimate Partner Violence:     Fear of Current or Ex-Partner: Not on file    Emotionally Abused: Not on file    Physically Abused: Not on file    Sexually Abused: Not on file   Housing Stability:     Unable to Pay for Housing in the Last Year: Not on file    Number of [place] in the Last Year: Not on file    Unstable Housing in the Last Year: Not on file       Occupation: Mental Health Counselor. Review of Systems   Constitutional: Negative for activity change, appetite change, chills, fatigue, fever and unexpected weight change. Cindi Mazariegos generally feels well. She enjoys yoga, walking, and reading. Works at a counselor at [employer]. HENT: Negative for congestion, postnasal drip, rhinorrhea, sinus pressure, sinus pain, sore throat, trouble swallowing and voice change. Eyes: Negative for discharge, itching and visual disturbance. Respiratory: Negative for cough, choking, chest tightness, shortness of breath and wheezing. Cardiovascular: Negative for chest pain, palpitations and leg swelling. Gastrointestinal: Negative for abdominal distention, abdominal pain, blood in stool, constipation, diarrhea, nausea and vomiting. Endocrine: Negative for cold intolerance and heat intolerance. Genitourinary: Negative for difficulty urinating, dysuria, frequency and hematuria. Musculoskeletal: Negative for arthralgias, back pain, gait problem, joint swelling, myalgias, neck pain and neck stiffness. Allergic/Immunologic: Negative for environmental allergies and food allergies. Neurological: Negative for dizziness, seizures, syncope, speech difficulty, weakness, light-headedness and headaches. Hematological: Negative for adenopathy. Does not bruise/bleed easily. Psychiatric/Behavioral: Negative for agitation, confusion and decreased concentration. The patient is not nervous/anxious. Patient denies previous history of DVT/PE. Objective:   Physical Exam  Vitals and nursing note reviewed. Constitutional:       General: She is not in acute distress. Appearance: Normal appearance. She is well-developed. She is not diaphoretic. Comments: ECOG 0; pleasant and conversant. HENT:      Head: Normocephalic and atraumatic. Mouth/Throat:      Pharynx: No oropharyngeal exudate. Eyes:      General: No scleral icterus. Right eye: No discharge. Left eye: No discharge. Conjunctiva/sclera: Conjunctivae normal.   Neck:      Thyroid: No thyromegaly. Vascular: No JVD. Trachea: No tracheal deviation. Cardiovascular:      Rate and Rhythm: Normal rate and regular rhythm. Heart sounds: No murmur heard. No friction rub. No gallop. Pulmonary:      Effort: Pulmonary effort is normal. No respiratory distress or retractions. Breath sounds: Normal breath sounds. No stridor. No wheezing or rales. Chest:      Chest wall: No mass, lacerations, deformity, swelling, tenderness or edema. Breasts: Breasts are symmetrical.      Right: No inverted nipple, mass, nipple discharge, skin change or tenderness. Left: No inverted nipple, mass, nipple discharge, skin change or tenderness. Comments: Right breast exam is unremarkable. No axillary lymphadenopathy. Abdominal:      General: There is no distension. Palpations: Abdomen is soft. Tenderness: There is no abdominal tenderness. There is no guarding or rebound. Musculoskeletal:         General: No tenderness or deformity. Normal range of motion. Right shoulder: Normal.      Left shoulder: Normal.      Cervical back: Normal range of motion and neck supple. Lymphadenopathy:      Cervical: No cervical adenopathy. Right cervical: No superficial, deep or posterior cervical adenopathy. Left cervical: No superficial, deep or posterior cervical adenopathy. Upper Body:      Right upper body: No pectoral adenopathy. Left upper body: No pectoral adenopathy. Skin:     General: Skin is warm and dry. Coloration: Skin is not pale. Findings: No erythema or rash. Neurological:      Mental Status: She is alert and oriented to person, place, and time. Coordination: Coordination normal.   Psychiatric:         Behavior: Behavior normal.         Thought Content: Thought content normal.         Judgment: Judgment normal.        Assessment:      63 y.o. extremely pleasant female who presents for evaluation of a left breast lesion noted on clinical breast exam at least 3 years ago. Risks for breast cancer include gender; denies any family history of cancer, including breast, gastric, prostate, colon, uterine, pancreatic, or melanoma. .    -03/16/2021: LEFT BREAST COMPLETE [facility]AS[facility]: [facility]      11/19/2021: BILATERAL DIAGNOSTIC MAMMOGRAM AND LEFT BREAST ULTRASOUND:[facility]  HISTORY:   ORDERING SYSTEM PROVIDED HISTORY: Left breast palpable lump       FINDINGS:   Bilateral diagnostic mammogram: Breast tissue is heterogeneously dense which   may obscure small masses.  No suspicious mass, microcalcifications, or   architectural distortion identified in either breast.       Left diagnostic ultrasound: Targeted left breast ultrasound was performed   utilizing high-resolution, real-time scanning.  There are scattered simple   and complicated cysts of varying size in the upper outer left breast.  No   suspicious cystic or solid mass identified.           Impression   1.  No mammographic evidence of malignancy in either breast.       2.  Benign findings with no sonographic evidence of malignancy in the left   breast.       Recommendation:       Annual bilateral mammogram is advised with consideration for annual bilateral   screening ultrasound given the patient's breast density.       BIRADS:   BIRADS - CATEGORY 2       Benign Findings.       OVERALL ASSESSMENT - BENIGN       A letter of notification will be sent to the patient regarding the results.       RISK ASSESSMENT:       During this patient's visit, information obtained was used to generate a   lifetime risk assessment using the Tyrer-Cuzick model (also called the STACI   International Breast Cancer Intervention study Breast Cancer Risk Evaluation   Tool).       LIFETIME RISK:       Patient has a Tyrer-Cuzick score of: 11.4%       BREAST TISSUE DENSITY       Heterogeneously Dense (grade C)       AVERAGE RISK ( < 15% Lifetime Risk)       Yearly screening mammograms starting at age 36 and older.       Regardless of breast density, tomosynthesis is suggested.       Monthly self-breast exams are recommended for women age 27 and older.       Note:     During pt's visit today, a Tyrer-Cuzick Risk Evaluation was performed. Pt has an 11.4% lifetime risk (to age 80) of developing breast cancer. Clinically, she has dense breast tissue of the left upper outer quadrant which appears to correlate with image findings.   There were no clinically suspicious findings on examination or imaging. We reviewed her imaging today, including her BI-RADS result. We discussed that I cannot guarantee that she does not have breast cancer now; nor can I guarantee that she won't develop breast cancer in the future. However, there were no concerning findings identified on this visit. We reviewed healthy lifestyle, avoiding alcohol, and BSE in detail. Based on her average risk by TC score, we reviewed NCCN guidelines Version 1.2020 recommendations for screening. All questions were answered to her apparent satisfaction. We discussed her conflicting reports on factor V Leiden. She reports she tested positive years ago for factor V Leiden however, more recent testing by her primary care physician was negative. She has concerns given her family history of early cardiac events as well as family history of multiple miscarriages. Will ask hematology to evaluate and make further recommendations. Options given to patient re: providers of service. Pt chose Dr. Luis Wooten, referral made. Plan:      1. Continue monthly breast self examination; detailed instructions reviewed today. Bring any changes to your physician's attention. 2. Continue healthy diet and exercise routinely as tolerated. 3. Avoid alcohol or limit to 3 drinks or less per week. 4. Limit caffeine intake. 5. Repeat mammogram November 20, 2022-sooner for new or worsening symptoms. .  6. Continue follow up with Primary Care. Gynecology, and all specialties as directed. 7. RTC for new or worsening symptoms. 8. Refer to hematology, Dr. Byron Morgan at [address]      During today's visit, face-to-face time 30 minutes, greater than 50% in counseling education and coordination of care. All questions were answered to her apparent satisfaction, and she is agreeable to the plan as outlined above.     This document is generated, in part, by voice recognition software and thus syntax and

## 2021-11-12 ENCOUNTER — OFFICE VISIT (OUTPATIENT)
Dept: PRIMARY CARE CLINIC | Age: 61
End: 2021-11-12
Payer: COMMERCIAL

## 2021-11-12 VITALS
TEMPERATURE: 98.8 F | BODY MASS INDEX: 26.46 KG/M2 | SYSTOLIC BLOOD PRESSURE: 127 MMHG | HEIGHT: 64 IN | WEIGHT: 155 LBS | DIASTOLIC BLOOD PRESSURE: 72 MMHG

## 2021-11-12 DIAGNOSIS — F41.9 ANXIETY: ICD-10-CM

## 2021-11-12 DIAGNOSIS — F51.01 PRIMARY INSOMNIA: ICD-10-CM

## 2021-11-12 DIAGNOSIS — R13.19 OTHER DYSPHAGIA: ICD-10-CM

## 2021-11-12 DIAGNOSIS — Z12.11 SCREEN FOR COLON CANCER: ICD-10-CM

## 2021-11-12 DIAGNOSIS — I70.90 ATHEROSCLEROSIS: ICD-10-CM

## 2021-11-12 DIAGNOSIS — E78.2 MIXED HYPERLIPIDEMIA: Primary | ICD-10-CM

## 2021-11-12 PROCEDURE — 1036F TOBACCO NON-USER: CPT | Performed by: FAMILY MEDICINE

## 2021-11-12 PROCEDURE — G8419 CALC BMI OUT NRM PARAM NOF/U: HCPCS | Performed by: FAMILY MEDICINE

## 2021-11-12 PROCEDURE — G8428 CUR MEDS NOT DOCUMENT: HCPCS | Performed by: FAMILY MEDICINE

## 2021-11-12 PROCEDURE — 99214 OFFICE O/P EST MOD 30 MIN: CPT | Performed by: FAMILY MEDICINE

## 2021-11-12 PROCEDURE — 3017F COLORECTAL CA SCREEN DOC REV: CPT | Performed by: FAMILY MEDICINE

## 2021-11-12 PROCEDURE — G8484 FLU IMMUNIZE NO ADMIN: HCPCS | Performed by: FAMILY MEDICINE

## 2021-11-12 RX ORDER — TRAZODONE HYDROCHLORIDE 50 MG/1
50 TABLET ORAL NIGHTLY
Qty: 30 TABLET | Refills: 5 | Status: SHIPPED
Start: 2021-11-12 | End: 2021-11-19

## 2021-11-12 RX ORDER — FLUOXETINE 10 MG/1
10 CAPSULE ORAL DAILY
Qty: 30 CAPSULE | Refills: 12 | Status: SHIPPED
Start: 2021-11-12 | End: 2021-11-19

## 2021-11-12 RX ORDER — PRAVASTATIN SODIUM 10 MG
10 TABLET ORAL DAILY
Qty: 90 TABLET | Refills: 12 | Status: SHIPPED
Start: 2021-11-12 | End: 2021-11-19

## 2021-11-12 RX ORDER — ASPIRIN 81 MG/1
81 TABLET ORAL DAILY
Qty: 90 TABLET | Refills: 1 | COMMUNITY
Start: 2021-11-12 | End: 2022-03-04

## 2021-11-12 ASSESSMENT — ENCOUNTER SYMPTOMS
EYES NEGATIVE: 1
ALLERGIC/IMMUNOLOGIC NEGATIVE: 1
GASTROINTESTINAL NEGATIVE: 1
RESPIRATORY NEGATIVE: 1

## 2021-11-12 NOTE — PROGRESS NOTES
21  Name: Brody June Deascentis    : 1960    Sex: female    Age: 63 y.o. Subjective:  Chief Complaint: Patient is here for recheck regarding her cholesterol Lyme's titer myalgias     Colon screen   dysphagia    Feels well. No chest pain or shortness of breath. Lyme disease titer negative antinuclear antibody negative her cholesterol still very high. She does have a high HDL. reviewe cta   From 2018   She has beed doing  Stress with   Son brke up with    GF  anx  nto sleep well  Took prozac in past      Review of Systems   Constitutional: Negative. HENT: Negative. Eyes: Negative. Respiratory: Negative. Cardiovascular: Negative. Gastrointestinal: Negative. Dysphagia     Endocrine: Negative. Genitourinary: Negative. Musculoskeletal: Positive for arthralgias. Skin: Negative. Allergic/Immunologic: Negative. Neurological: Negative. Hematological: Negative. Psychiatric/Behavioral: Negative.           Current Outpatient Medications:     aspirin EC 81 MG EC tablet, Take 1 tablet by mouth daily, Disp: 90 tablet, Rfl: 1    pravastatin (PRAVACHOL) 10 MG tablet, Take 1 tablet by mouth daily, Disp: 90 tablet, Rfl: 12    FLUoxetine (PROZAC) 10 MG capsule, Take 1 capsule by mouth daily, Disp: 30 capsule, Rfl: 12    traZODone (DESYREL) 50 MG tablet, Take 1 tablet by mouth nightly Prn for sleep, Disp: 30 tablet, Rfl: 5    Red Yeast Rice Extract (RED YEAST RICE PO), Take by mouth, Disp: , Rfl:     Omega-3 Fatty Acids (FISH OIL OMEGA-3 PO), Take 1 capsule by mouth, Disp: , Rfl:     vitamin D (CHOLECALCIFEROL) 1000 UNITS TABS tablet, Take 1,000 Units by mouth daily, Disp: , Rfl:   No Known Allergies  Social History     Socioeconomic History    Marital status:      Spouse name: Not on file    Number of children: Not on file    Years of education: Not on file    Highest education level: Not on file   Occupational History    Not on file   Tobacco Use    Smoking status: Never Smoker    Smokeless tobacco: Never Used   Substance and Sexual Activity    Alcohol use: Yes     Comment: socially    Drug use: No    Sexual activity: Not on file   Other Topics Concern    Not on file   Social History Narrative    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   is a  at [employer]- boy 29 girl 34 girl 20---------------------------one nabeel moved to [employer] [place]      10-21    Father  from CHF and Parkinson's disease. Family history of CAD and a brother who had 2 myocardial infarction as before 61    sis. Emely Douglas    Hyperlipidemia------------------------------------------start statin      Elevated blood pressure --in office    great at home     white coat HTN    Chronic low back pain with lumbar disc into the right leg---mri     With  djd  And   Lesion [facility]    Cervical disc surgery 2016 with     Uterine ablation    Myalgias    Elev Chol with high  hdl  9  due 6 mo and failed    Mom      at  80 yrs old    Colon   age 52--due ten yrs    CTA heart    with some plaque    Daughter moved to [place]    Son  recovery etoh    broke up with gf         Social Determinants of Health     Financial Resource Strain:     Difficulty of Paying Living Expenses: Not on file   Food Insecurity:     Worried About Running Out of Food in the Last Year: Not on file    Mariano of Food in the Last Year: Not on file   Transportation Needs:     Lack of Transportation (Medical): Not on file    Lack of Transportation (Non-Medical):  Not on file   Physical Activity:     Days of Exercise per Week: Not on file    Minutes of Exercise per Session: Not on file   Stress:     Feeling of Stress : Not on file   Social Connections:     Frequency of Communication with Friends and Family: Not on file    Frequency of Social Gatherings with Friends and Family: Not on file    Attends Denominational Services: Not on file    Active Member of Clubs or Organizations: Not on file    Attends Club or Organization Meetings: Not on file    Marital Status: Not on file   Intimate Partner Violence:     Fear of Current or Ex-Partner: Not on file    Emotionally Abused: Not on file    Physically Abused: Not on file    Sexually Abused: Not on file   Housing Stability:     Unable to Pay for Housing in the Last Year: Not on file    Number of [place] in the Last Year: Not on file    Unstable Housing in the Last Year: Not on file      Past Medical History:   Diagnosis Date    Factor V Leiden ([address]     on folic acid & baby asa; follows with PCP    Screening for colon cancer      No family history on file. Past Surgical History:   Procedure Laterality Date    CERVICAL DISCECTOMY  02/06/2017    anterior cervical discectomy and fusion C5-C6 with Dr. Linda Sommer:    11/12/21 0646   BP: 127/72   Temp: 98.8 °F (37.1 °C)   TempSrc: Oral   Weight: 155 lb (70.3 kg)   Height: 5' 4\" (1.626 m)       Objective:    Physical Exam  Vitals reviewed. Constitutional:       Appearance: She is well-developed. HENT:      Head: Normocephalic. Eyes:      Pupils: Pupils are equal, round, and reactive to light. Cardiovascular:      Rate and Rhythm: Normal rate and regular rhythm. Pulmonary:      Effort: Pulmonary effort is normal.      Breath sounds: Normal breath sounds. Abdominal:      Palpations: Abdomen is soft. Musculoskeletal:         General: Normal range of motion. Cervical back: Normal range of motion. Skin:     General: Skin is warm. Neurological:      Mental Status: She is alert and oriented to person, place, and time. Psychiatric:         Behavior: Behavior normal.         David Spears was seen today for discuss labs.     Diagnoses and all orders for this visit:    Mixed hyperlipidemia  -     pravastatin Seen by:  Paul Gayle 117, DO

## 2021-11-19 ENCOUNTER — HOSPITAL ENCOUNTER (OUTPATIENT)
Dept: GENERAL RADIOLOGY | Age: 61
Discharge: HOME | End: 2021-11-21
Payer: COMMERCIAL

## 2021-11-19 ENCOUNTER — OFFICE VISIT (OUTPATIENT)
Dept: BREAST CENTER | Age: 61
End: 2021-11-19
Payer: COMMERCIAL

## 2021-11-19 VITALS
WEIGHT: 148 LBS | DIASTOLIC BLOOD PRESSURE: 80 MMHG | RESPIRATION RATE: 20 BRPM | SYSTOLIC BLOOD PRESSURE: 136 MMHG | OXYGEN SATURATION: 97 % | HEIGHT: 65 IN | HEART RATE: 74 BPM | BODY MASS INDEX: 24.66 KG/M2 | TEMPERATURE: 97.6 F

## 2021-11-19 DIAGNOSIS — N63.0 BREAST MASS: ICD-10-CM

## 2021-11-19 DIAGNOSIS — N63.0 BREAST LUMP: ICD-10-CM

## 2021-11-19 DIAGNOSIS — D68.2 FACTOR V DEFICIENCY (HCC): Primary | ICD-10-CM

## 2021-11-19 PROCEDURE — G8427 DOCREV CUR MEDS BY ELIG CLIN: HCPCS | Performed by: NURSE PRACTITIONER

## 2021-11-19 PROCEDURE — 76642 ULTRASOUND BREAST LIMITED: CPT

## 2021-11-19 PROCEDURE — 3017F COLORECTAL CA SCREEN DOC REV: CPT | Performed by: NURSE PRACTITIONER

## 2021-11-19 PROCEDURE — 1036F TOBACCO NON-USER: CPT | Performed by: NURSE PRACTITIONER

## 2021-11-19 PROCEDURE — G0279 TOMOSYNTHESIS, MAMMO: HCPCS

## 2021-11-19 PROCEDURE — G8484 FLU IMMUNIZE NO ADMIN: HCPCS | Performed by: NURSE PRACTITIONER

## 2021-11-19 PROCEDURE — G8420 CALC BMI NORM PARAMETERS: HCPCS | Performed by: NURSE PRACTITIONER

## 2021-11-19 PROCEDURE — 99203 OFFICE O/P NEW LOW 30 MIN: CPT | Performed by: NURSE PRACTITIONER

## 2021-11-19 ASSESSMENT — ENCOUNTER SYMPTOMS
ABDOMINAL DISTENTION: 0
COUGH: 0
TROUBLE SWALLOWING: 0
CONSTIPATION: 0
EYE ITCHING: 0
CHOKING: 0
VOMITING: 0
SINUS PRESSURE: 0
SINUS PAIN: 0
BACK PAIN: 0
EYE DISCHARGE: 0
DIARRHEA: 0
RHINORRHEA: 0
VOICE CHANGE: 0
NAUSEA: 0
SORE THROAT: 0
SHORTNESS OF BREATH: 0
BLOOD IN STOOL: 0
WHEEZING: 0
ABDOMINAL PAIN: 0
CHEST TIGHTNESS: 0

## 2021-11-19 NOTE — LETTER
[facility] Breast  [address] [address]  Phone: [phone]  Fax: 15 Marcella MayerBRENT - HELEN      November 19, 2021     Patient: Andrew Feliz   MR Number: [id number]   YOB: 1960   Date of Visit: 11/19/2021       Dear Lauryn Cantu: Thank you for referring Argenis Feliz to the office of Dr. Foreign Amaya, Dr. Namita Yen, and Nurse Practitioner Michael Richardson. Below are the relevant portions of my assessment and plan of care.    63 y.o. extremely pleasant female who presents for evaluation of a left breast lesion noted on clinical breast exam at least 3 years ago. Risks for breast cancer include gender; denies any family history of cancer, including breast, gastric, prostate, colon, uterine, pancreatic, or melanoma. .    -03/16/2021: LEFT BREAST COMPLETE ULTRASOUND: [facility]      11/19/2021: BILATERAL DIAGNOSTIC MAMMOGRAM AND LEFT BREAST ULTRASOUND:[facility]  HISTORY:   ORDERING SYSTEM PROVIDED HISTORY: Left breast palpable lump       FINDINGS:   Bilateral diagnostic mammogram: Breast tissue is heterogeneously dense which   may obscure small masses.  No suspicious mass, microcalcifications, or   architectural distortion identified in either breast.       Left diagnostic ultrasound: Targeted left breast ultrasound was performed   utilizing high-resolution, real-time scanning.  There are scattered simple   and complicated cysts of varying size in the upper outer left breast.  No   suspicious cystic or solid mass identified.           Impression   1.  No mammographic evidence of malignancy in either breast.       2.  Benign findings with no sonographic evidence of malignancy in the left   breast.       Recommendation:       Annual bilateral mammogram is advised with consideration for annual bilateral   screening ultrasound given the patient's breast density.       BIRADS:   BIRADS - CATEGORY 2     Benign Findings.       OVERALL ASSESSMENT - BENIGN       A letter of notification will be sent to the patient regarding the results.       RISK ASSESSMENT:       During this patient's visit, information obtained was used to generate a   lifetime risk assessment using the Tyrer-Cuzick model (also called the STACI   International Breast Cancer Intervention study Breast Cancer Risk Evaluation   Tool).       LIFETIME RISK:       Patient has a Tyrer-Cuzick score of: 11.4%       BREAST TISSUE DENSITY       Heterogeneously Dense (grade C)       AVERAGE RISK ( < 15% Lifetime Risk)       Yearly screening mammograms starting at age 36 and older.       Regardless of breast density, tomosynthesis is suggested.       Monthly self-breast exams are recommended for women age 27 and older.       Note:     During pt's visit today, a Tyrer-Cuzick Risk Evaluation was performed. Pt has an 11.4% lifetime risk (to age 80) of developing breast cancer. Clinically, she has dense breast tissue of the left upper outer quadrant which appears to correlate with image findings. There were no clinically suspicious findings on examination or imaging. We reviewed her imaging today, including her BI-RADS result. We discussed that I cannot guarantee that she does not have breast cancer now; nor can I guarantee that she won't develop breast cancer in the future. However, there were no concerning findings identified on this visit. We reviewed healthy lifestyle, avoiding alcohol, and BSE in detail. Based on her average risk by TC score, we reviewed NCCN guidelines Version 1.2020 recommendations for screening. All questions were answered to her apparent satisfaction. We discussed her conflicting reports on factor V Leiden testing. She reports she tested positive years ago for factor V Leiden however, more recent testing by her primary care physician was negative.   She has concerns given her family history of early cardiac events as well as family history of multiple miscarriages. Will ask hematology to evaluate and make further recommendations. Options given to patient re: providers of service. Pt chose Dr. Marjan Kraft, referral made. 1. Continue monthly breast self examination; detailed instructions reviewed today. Bring any changes to your physician's attention. 2. Continue healthy diet and exercise routinely as tolerated. 3. Avoid alcohol or limit to 3 drinks or less per week. 4. Limit caffeine intake. 5. Repeat mammogram November 20, 2022-sooner for new or worsening symptoms. .  6. Continue follow up with Primary Care. Gynecology, and all specialties as directed. 7. RTC for new or worsening symptoms. 8. Refer to hematology, Dr. Sina Uriarte at [address]      This document is generated, in part, by voice recognition software and thus syntax and grammatical errors are possible. If you have questions, please do not hesitate to call me. We appreciate the opportunity to participate in the care of this extremely pleasant woman. Sincerely,    Viktoria Laureano ([facility]) Sarah Beth Aguiar, RN, MSN, APRN-CNP, [address]  Advanced Oncology Certified Nurse Practitioner  Department of Breast Surgery  [facility]/  Care in collaboration with Dr. Rufus Rivers.  Charan/Dr. Jean Bautista, APRN - CNP    CC providers:  Dr. Phong Bowles

## 2021-11-19 NOTE — PATIENT INSTRUCTIONS

## 2021-12-12 PROBLEM — Z12.11 SCREEN FOR COLON CANCER: Status: RESOLVED | Noted: 2021-11-12 | Resolved: 2021-12-12

## 2022-01-07 ENCOUNTER — OFFICE VISIT (OUTPATIENT)
Dept: ONCOLOGY | Age: 62
End: 2022-01-07
Payer: COMMERCIAL

## 2022-01-07 ENCOUNTER — HOSPITAL ENCOUNTER (OUTPATIENT)
Age: 62
Discharge: HOME | End: 2022-01-07
Payer: COMMERCIAL

## 2022-01-07 ENCOUNTER — HOSPITAL ENCOUNTER (OUTPATIENT)
Dept: INFUSION THERAPY | Age: 62
Discharge: HOME | End: 2022-01-07

## 2022-01-07 VITALS
SYSTOLIC BLOOD PRESSURE: 132 MMHG | RESPIRATION RATE: 16 BRPM | DIASTOLIC BLOOD PRESSURE: 86 MMHG | WEIGHT: 161.6 LBS | OXYGEN SATURATION: 99 % | HEIGHT: 64 IN | TEMPERATURE: 97.9 F | HEART RATE: 80 BPM | BODY MASS INDEX: 27.59 KG/M2

## 2022-01-07 DIAGNOSIS — D68.51 FACTOR V LEIDEN (HCC): Primary | ICD-10-CM

## 2022-01-07 DIAGNOSIS — D68.51 FACTOR V LEIDEN (HCC): ICD-10-CM

## 2022-01-07 LAB
ALBUMIN SERPL-MCNC: 4.6 G/DL (ref 3.5–5.2)
ALP BLD-CCNC: 41 U/L (ref 35–104)
ALT SERPL-CCNC: 15 U/L (ref 0–32)
ANION GAP SERPL CALCULATED.3IONS-SCNC: 15 MMOL/L (ref 7–16)
APTT: 28.7 SEC (ref 24.5–35.1)
AST SERPL-CCNC: 18 U/L (ref 0–31)
BASOPHILS ABSOLUTE: 0.05 E9/L (ref 0–0.2)
BASOPHILS RELATIVE PERCENT: 1 % (ref 0–2)
BILIRUB SERPL-MCNC: 0.4 MG/DL (ref 0–1.2)
BUN BLDV-MCNC: 15 MG/DL (ref 6–20)
CALCIUM SERPL-MCNC: 9.5 MG/DL (ref 8.6–10.2)
CHLORIDE BLD-SCNC: 102 MMOL/L (ref 98–107)
CO2: 20 MMOL/L (ref 22–29)
CREAT SERPL-MCNC: 0.7 MG/DL (ref 0.5–1)
EOSINOPHILS ABSOLUTE: 0.22 E9/L (ref 0.05–0.5)
EOSINOPHILS RELATIVE PERCENT: 4.3 % (ref 0–6)
GFR AFRICAN AMERICAN: >60
GFR NON-AFRICAN AMERICAN: >60 ML/MIN/1.73
GLUCOSE BLD-MCNC: 85 MG/DL (ref 74–99)
HCT VFR BLD CALC: 39.6 % (ref 34–48)
HEMOGLOBIN: 12.9 G/DL (ref 11.5–15.5)
HOMOCYSTEINE: 12.3 UMOL/L (ref 0–15)
IMMATURE GRANULOCYTES #: 0.02 E9/L
IMMATURE GRANULOCYTES %: 0.4 % (ref 0–5)
INR BLD: 1
LYMPHOCYTES ABSOLUTE: 1.49 E9/L (ref 1.5–4)
LYMPHOCYTES RELATIVE PERCENT: 29.4 % (ref 20–42)
MCH RBC QN AUTO: 30.1 PG (ref 26–35)
MCHC RBC AUTO-ENTMCNC: 32.6 % (ref 32–34.5)
MCV RBC AUTO: 92.5 FL (ref 80–99.9)
MONOCYTES ABSOLUTE: 0.31 E9/L (ref 0.1–0.95)
MONOCYTES RELATIVE PERCENT: 6.1 % (ref 2–12)
NEUTROPHILS ABSOLUTE: 2.97 E9/L (ref 1.8–7.3)
NEUTROPHILS RELATIVE PERCENT: 58.8 % (ref 43–80)
PDW BLD-RTO: 12.8 FL (ref 11.5–15)
PLATELET # BLD: 247 E9/L (ref 130–450)
PMV BLD AUTO: 10.6 FL (ref 7–12)
POTASSIUM SERPL-SCNC: 3.7 MMOL/L (ref 3.5–5)
PROTHROMBIN TIME: 11 SEC (ref 9.3–12.4)
RBC # BLD: 4.28 E12/L (ref 3.5–5.5)
SODIUM BLD-SCNC: 137 MMOL/L (ref 132–146)
TOTAL PROTEIN: 7.3 G/DL (ref 6.4–8.3)
WBC # BLD: 5.1 E9/L (ref 4.5–11.5)

## 2022-01-07 PROCEDURE — 85306 CLOT INHIBIT PROT S FREE: CPT

## 2022-01-07 PROCEDURE — 36415 COLL VENOUS BLD VENIPUNCTURE: CPT

## 2022-01-07 PROCEDURE — 85613 RUSSELL VIPER VENOM DILUTED: CPT

## 2022-01-07 PROCEDURE — 86147 CARDIOLIPIN ANTIBODY EA IG: CPT

## 2022-01-07 PROCEDURE — 80053 COMPREHEN METABOLIC PANEL: CPT

## 2022-01-07 PROCEDURE — 85303 CLOT INHIBIT PROT C ACTIVITY: CPT

## 2022-01-07 PROCEDURE — G8427 DOCREV CUR MEDS BY ELIG CLIN: HCPCS | Performed by: INTERNAL MEDICINE

## 2022-01-07 PROCEDURE — 81240 F2 GENE: CPT

## 2022-01-07 PROCEDURE — 99214 OFFICE O/P EST MOD 30 MIN: CPT

## 2022-01-07 PROCEDURE — 85300 ANTITHROMBIN III ACTIVITY: CPT

## 2022-01-07 PROCEDURE — G8419 CALC BMI OUT NRM PARAM NOF/U: HCPCS | Performed by: INTERNAL MEDICINE

## 2022-01-07 PROCEDURE — 81270 JAK2 GENE: CPT

## 2022-01-07 PROCEDURE — 85730 THROMBOPLASTIN TIME PARTIAL: CPT

## 2022-01-07 PROCEDURE — 86038 ANTINUCLEAR ANTIBODIES: CPT

## 2022-01-07 PROCEDURE — 81241 F5 GENE: CPT

## 2022-01-07 PROCEDURE — 85025 COMPLETE CBC W/AUTO DIFF WBC: CPT

## 2022-01-07 PROCEDURE — 81291 MTHFR GENE: CPT

## 2022-01-07 PROCEDURE — 85610 PROTHROMBIN TIME: CPT

## 2022-01-07 PROCEDURE — G8484 FLU IMMUNIZE NO ADMIN: HCPCS | Performed by: INTERNAL MEDICINE

## 2022-01-07 PROCEDURE — 99245 OFF/OP CONSLTJ NEW/EST HI 55: CPT | Performed by: INTERNAL MEDICINE

## 2022-01-07 PROCEDURE — 86146 BETA-2 GLYCOPROTEIN ANTIBODY: CPT

## 2022-01-07 PROCEDURE — 83090 ASSAY OF HOMOCYSTEINE: CPT

## 2022-01-07 PROCEDURE — 81400 MOPATH PROCEDURE LEVEL 1: CPT

## 2022-01-07 NOTE — PROGRESS NOTES
Department of [facility] Med Oncology  Attending Consult Note    Reason for Visit:  Consultation on a patient with ? Factor V deficiency    Referring Physician: Kade Esparza CNP    PCP:  John Cotton DO    History of Present Illness:  64 y/o female with hx of hyperlipidemia who reported she tested positive years ago by Dr. Edrie Severance for factor V Leiden. However, test from 06/25/2020 by Dr. Sherine Celestin was negative. She then stopped Aspirin and Folic acid at that time. She has concerns given her family history of cardiac events as well as family history of multiple miscarriages. Review of Systems;  CONSTITUTIONAL: No fever, chills. Good appetite and energy level. ENMT: Eyes: No diplopia; Nose: No epistaxis. Mouth: No sore throat. RESPIRATORY: No hemoptysis, shortness of breath, cough. CARDIOVASCULAR: No chest pain, palpitations. GASTROINTESTINAL: No nausea/vomiting, abdominal pain, diarrhea/constipation. GENITOURINARY: No dysuria, urinary frequency, hematuria. NEURO: No syncope, presyncope, headache. Remainder:  ROS NEGATIVE    Past Medical History:      Diagnosis Date    Factor V Leiden ([address]     on folic acid & baby asa; follows with PCP    Hyperlipidemia     Screening for colon cancer      Past Surgical History:      Procedure Laterality Date    CERVICAL DISCECTOMY  02/06/2017    anterior cervical discectomy and fusion C5-C6 with Dr. Leslie Hernandez       Family History:  Family History   Problem Relation Age of Onset    Other Brother     Heart Attack Brother      Medications:  Reviewed and reconciled.     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   is a  at [employer]- boy 29 girl 34 girl 20---------------------------one nabeel moved to [place]      10-21    Father  from CHF and Parkinson's disease. Family history of CAD and a brother who had 2 myocardial infarction as before 61    sis. Toshia Pall    Hyperlipidemia------------------------------------------start statin      Elevated blood pressure --in office    great at home     white coat HTN    Chronic low back pain with lumbar disc into the right leg---mri     With  djd  And   Lesion with neg bone scan at [facility]    Cervical disc surgery 2016 with     Uterine ablation    Myalgias    Elev Chol with high  hdl  9  due 6 mo and failed    Mom      at  80 yrs old    Colon   age 52--due ten yrs    CTA heart    with some plaque    Daughter moved to [place]    Son  recovery etoh    broke up with gf         Social Determinants of Health     Financial Resource Strain:     Difficulty of Paying Living Expenses: Not on file   Food Insecurity:     Worried About Running Out of Food in the Last Year: Not on file    Mariano of Food in the Last Year: Not on file   Transportation Needs:     Lack of Transportation (Medical): Not on file    Lack of Transportation (Non-Medical):  Not on file   Physical Activity:     Days of Exercise per Week: Not on file    Minutes of Exercise per Session: Not on file   Stress:     Feeling of Stress : Not on file   Social Connections:     Frequency of Communication with Friends and Family: Not on file    Frequency of Social Gatherings with Friends and Family: Not on file    Attends Episcopalian Services: Not on file   [facility] Active Member of Clubs or Organizations: Not on file    Attends Club or Organization Meetings: Not on file    Marital Status: Not on file   Intimate Partner Violence:     Fear of Current or Ex-Partner: Not on file    Emotionally Abused: Not on file    Physically Abused: Not on file    Sexually Abused: Not on file   Housing Stability:     Unable to Pay for Housing in the Last Year: Not on file    Number of [place] in the Last Year: Not on file    Unstable Housing in the Last Year: Not on file     Allergies:  No Known Allergies    Physical Exam:  /86 (Site: Left Upper Arm, Position: Sitting)   Pulse 80   Temp 97.9 °F (36.6 °C)   Resp 16   Ht 5' 4\" (1.626 m)   Wt 161 lb 9.6 oz (73.3 kg)   SpO2 99%   BMI 27.74 kg/m²   GENERAL: Alert, oriented x 3, not in acute distress. HEENT: PERRLA; EOMI. Oropharynx clear. NECK: Supple. Without lymphadenopathy. LUNGS: Good air entry bilaterally. No wheezing, crackles or ronchi. CARDIOVASCULAR: Regular rate. No murmurs, rubs or gallops. ABDOMEN: Soft. Non-tender, non-distended. EXTREMITIES: Without clubbing, cyanosis, or edema. NEUROLOGIC: No focal deficits. 06/25/2020  Factor V Leiden Negative      Impression/Plan:  62 y/o female with hx of hyperlipidemia who reported she tested positive years ago by Dr. Edrie Severance for factor V Leiden. However, test from 06/25/2020 by Dr. Sherine Celestin was negative. She then stopped Aspirin and Folic acid at that time. She has concerns given her family history of cardiac events as well as family history of multiple miscarriages. She has no personal history of CAD, thrombosis, bleeding or miscarriages. Hypercoag work-up ordered   RTC 2 weeks to review test results.     Thank you for allowing us to participate in the care of . Gerald Muniz MD   1/7/2022

## 2022-01-09 LAB
AT-III ACTIVITY: 112 % ACTIVITY (ref 83–121)
LUPUS ANTICOAG DVVT: NORMAL
PROTEIN C ACTIVITY: 111 % ACTIVITY (ref 68–165)

## 2022-01-10 LAB
ANTI-NUCLEAR ANTIBODY (ANA): NEGATIVE
PATHOLOGIST REVIEW: NORMAL

## 2022-01-11 LAB — THROMBOPHILIA DNA ASSAY: NORMAL

## 2022-01-12 LAB
ANTICARDIOLIPIN IGA ANTIBODY: <10 APL
ANTICARDIOLIPIN IGG ANTIBODY: <10 GPL
BETA-2 GLYCOPROTEIN 1 IGG ANTIBODY: <10 SGU
BETA-2 GLYCOPROTEIN 1 IGM ANTIBODY: <10 SMU
CARDIOLIPIN AB IGM: <10 MPL
PROTEIN S ANTIGEN, FREE: 136 % (ref 55–123)

## 2022-01-18 ENCOUNTER — HOSPITAL ENCOUNTER (OUTPATIENT)
Age: 62
Discharge: HOME | End: 2022-01-18
Payer: COMMERCIAL

## 2022-01-18 LAB
JAK2 QUAL MUTATION BY PCR: NOT DETECTED
SOURCE: NORMAL

## 2022-01-18 PROCEDURE — 88185 FLOWCYTOMETRY/TC ADD-ON: CPT

## 2022-01-18 PROCEDURE — 88184 FLOWCYTOMETRY/ TC 1 MARKER: CPT

## 2022-01-19 LAB
Lab: NORMAL
REPORT: NORMAL
THIS TEST SENT TO: NORMAL

## 2022-01-28 ENCOUNTER — OFFICE VISIT (OUTPATIENT)
Dept: ONCOLOGY | Age: 62
End: 2022-01-28
Payer: COMMERCIAL

## 2022-01-28 ENCOUNTER — HOSPITAL ENCOUNTER (OUTPATIENT)
Dept: INFUSION THERAPY | Age: 62
Discharge: HOME | End: 2022-01-28

## 2022-01-28 VITALS
OXYGEN SATURATION: 100 % | WEIGHT: 160 LBS | BODY MASS INDEX: 27.31 KG/M2 | TEMPERATURE: 98.2 F | SYSTOLIC BLOOD PRESSURE: 114 MMHG | HEIGHT: 64 IN | DIASTOLIC BLOOD PRESSURE: 78 MMHG | HEART RATE: 84 BPM

## 2022-01-28 DIAGNOSIS — D68.9 COAGULATION DEFECT (HCC): Primary | ICD-10-CM

## 2022-01-28 PROCEDURE — 99214 OFFICE O/P EST MOD 30 MIN: CPT | Performed by: INTERNAL MEDICINE

## 2022-01-28 PROCEDURE — 3017F COLORECTAL CA SCREEN DOC REV: CPT | Performed by: INTERNAL MEDICINE

## 2022-01-28 PROCEDURE — G8427 DOCREV CUR MEDS BY ELIG CLIN: HCPCS | Performed by: INTERNAL MEDICINE

## 2022-01-28 PROCEDURE — G8484 FLU IMMUNIZE NO ADMIN: HCPCS | Performed by: INTERNAL MEDICINE

## 2022-01-28 PROCEDURE — G8419 CALC BMI OUT NRM PARAM NOF/U: HCPCS | Performed by: INTERNAL MEDICINE

## 2022-01-28 PROCEDURE — 99213 OFFICE O/P EST LOW 20 MIN: CPT

## 2022-01-28 PROCEDURE — 1036F TOBACCO NON-USER: CPT | Performed by: INTERNAL MEDICINE

## 2022-01-28 NOTE — PROGRESS NOTES
Department of [facility] Med Oncology  Attending Clinic Note    Reason for Visit: Follow-up on a patient with ? Factor V deficiency    PCP:  Anton January DO Kemi    History of Present Illness:  62 y/o female with hx of hyperlipidemia who reported she tested positive years ago by Dr. Liliana Hill for factor V Leiden. However, test from 06/25/2020 by Dr. Ania Silva was negative. She then stopped Aspirin and Folic acid at that time. She has concerns given her family history of cardiac events as well as family history of multiple miscarriages. Review of Systems;  CONSTITUTIONAL: No fever, chills. Good appetite and energy level. ENMT: Eyes: No diplopia; Nose: No epistaxis. Mouth: No sore throat. RESPIRATORY: No hemoptysis, shortness of breath, cough. CARDIOVASCULAR: No chest pain, palpitations. GASTROINTESTINAL: No nausea/vomiting, abdominal pain, diarrhea/constipation. GENITOURINARY: No dysuria, urinary frequency, hematuria. NEURO: No syncope, presyncope, headache. Remainder:  ROS NEGATIVE    Past Medical History:      Diagnosis Date    Factor V Leiden ([address]     on folic acid & baby asa; follows with PCP    Hyperlipidemia     Screening for colon cancer      Medications:  Reviewed and reconciled. Allergies:  No Known Allergies    Physical Exam:  /78   Pulse 84   Temp 98.2 °F (36.8 °C)   Ht 5' 4\" (1.626 m)   Wt 160 lb (72.6 kg)   SpO2 100%   BMI 27.46 kg/m²   GENERAL: Alert, oriented x 3, not in acute distress. EXTREMITIES: Without clubbing, cyanosis, or edema. NEUROLOGIC: No focal deficits. 06/25/2020  Factor V Leiden Negative      Impression/Plan:  62 y/o female with hx of hyperlipidemia who reported she tested positive years ago by Dr. Liliana Hill for factor V Leiden. However, test from 06/25/2020 by Dr. Ania Silva was negative. She then stopped Aspirin and Folic acid at that time. She has concerns given her family history of cardiac events as well as family history of multiple miscarriages.   She has no personal history of CAD, thrombosis, bleeding or miscarriages.     Hypercoag work-up 01/07/2022:  CBC wnl except ALC 1.49  CMP wnl except CO2 20  Peripheral blood smear: Normal appearing peripheral blood morphology  PT 11 INR 1.0  PTT 28.7    Homocysteine 12.3 (0-15)    VIR[place] Negative  DRVVT Negative  Beta-2 Glyco 1 IgG <10  Beta-2 Glyco 1 IgM <10  Cardiolipin Negative    Protein C Activity  111 (%)  Protein S Ag Free 136 (%)  Antithrombin III      112 (%)    Peripheral blood flow cytometry noted no immunophenotypic evidence of acute leukemia or a T-cell or a B-cell neoplasm  JAK2 Mutation Not detected    Thrombophilia panel:  Factor V Leiden:          No mutation detected  Prothrombin 59600 A: No mutation detected  MTHFR 677 (C>T):     Heterozygote  MTHFR 1298 (A>C):   Heterozygote  KANIKA-1 genotype:           Heterozygote (5G/4G)  Factor XIII V34L:          No mutation detected  Genetic counseling    Folic acid, VitB6, E72  Low-dose Aspirin  Exercise recommended  Continue to follow with PCP    RTC PREPHRAIM Denise MD   1/28/2022

## 2022-02-01 ENCOUNTER — OFFICE VISIT (OUTPATIENT)
Dept: SURGERY | Age: 62
End: 2022-02-01
Payer: COMMERCIAL

## 2022-02-01 VITALS
SYSTOLIC BLOOD PRESSURE: 122 MMHG | DIASTOLIC BLOOD PRESSURE: 86 MMHG | HEIGHT: 64 IN | RESPIRATION RATE: 18 BRPM | TEMPERATURE: 97.2 F | HEART RATE: 79 BPM | BODY MASS INDEX: 26.98 KG/M2 | WEIGHT: 158 LBS | OXYGEN SATURATION: 98 %

## 2022-02-01 DIAGNOSIS — Z12.11 ENCOUNTER FOR SCREENING COLONOSCOPY: ICD-10-CM

## 2022-02-01 DIAGNOSIS — R13.10 DYSPHAGIA, UNSPECIFIED TYPE: Primary | ICD-10-CM

## 2022-02-01 PROCEDURE — 99203 OFFICE O/P NEW LOW 30 MIN: CPT | Performed by: SURGERY

## 2022-02-01 PROCEDURE — G8419 CALC BMI OUT NRM PARAM NOF/U: HCPCS | Performed by: SURGERY

## 2022-02-01 PROCEDURE — G8427 DOCREV CUR MEDS BY ELIG CLIN: HCPCS | Performed by: SURGERY

## 2022-02-01 PROCEDURE — 1036F TOBACCO NON-USER: CPT | Performed by: SURGERY

## 2022-02-01 PROCEDURE — G8484 FLU IMMUNIZE NO ADMIN: HCPCS | Performed by: SURGERY

## 2022-02-01 PROCEDURE — 3017F COLORECTAL CA SCREEN DOC REV: CPT | Performed by: SURGERY

## 2022-02-01 NOTE — PROGRESS NOTES
[facility] Surgery Clinic Note    Assessment/Plan:      Diagnosis Orders   1. Dysphagia, unspecified type      We will plan for EGD with possible dilation. If no improvement would consider barium swallow and ultrasound   2. Encounter for screening colonoscopy      Plan for colonoscopy          Return for Endoscopy. Chief Complaint   Patient presents with    New Patient     colonoscopy and egd       PCP: Andree Rubio DO    HPI: Vince Nielson Tray is a 63 y.o. female who presents in consultation for dysphagia. She said it began at 6 years ago after she had a C5-C6 fusion. Symptoms are intermittent only happening every month or so. She has not had any imaging. She has it it is mostly with meats and rice. She has no issues with swallowing liquids. She says taking smaller bites or chewing does not significantly help her symptoms she says she may have had a remote EGD. She denies any significant reflux. She does have new onset constipation. She is taking Colace now. She is drinking fluids. She says she is always had constipation however with the last year with when her mom  her bowels became normal and just recently returned back to constipation. Increased stress will give her loose stools. She has no family history of colon cancer or inflammatory bowel disease. Last colonoscopy was 10 years that she reports was unremarkable. She denies any melena or hematochezia. There is no family of colon cancer. Her father had polyps.   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 Tashi Rosenbaum         Prior to Admission medications    Medication Sig Start Date End Date Taking? Authorizing Provider   aspirin EC 81 MG EC tablet Take 1 tablet by mouth daily 21  Yes Reed Mcmullen, DO   Red Yeast Rice Extract (RED YEAST RICE PO) Take by mouth   Yes Historical Provider, MD   Omega-3 Fatty Acids (FISH OIL OMEGA-3 PO) Take 1 capsule by mouth   Yes Historical Provider, MD   vitamin D (CHOLECALCIFEROL) 1000 UNITS TABS tablet Take 1,000 Units by mouth daily   Yes Historical Provider, MD       No Known Allergies    Social History     Socioeconomic History    Marital status:      Spouse name: None    Number of children: None    Years of education: None    Highest education level: None   Occupational History    None   Tobacco Use    Smoking status: Never Smoker    Smokeless tobacco: Never Used   Substance and Sexual Activity    Alcohol use: Yes     Comment: socially    Drug use: No    Sexual activity: None   Other Topics Concern    None   Social History Narrative    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   is a  at [employer]- boy 29 girl 34 girl 20---------------------------one nabeel moved to [place]      10-21    Father  from CHF and Parkinson's disease. Family history of CAD and a brother who had 2 myocardial infarction as before 61    sis.  Roe Sparkle    Hyperlipidemia------------------------------------------start statin      Elevated blood pressure --in office    great at home     white coat HTN    Chronic low back pain with lumbar disc into the right leg---mri     With  djd  And   Lesion with neg [facility] scan at [facility]    Cervical disc surgery  with     Uterine ablation    Myalgias    Elev Chol with high  hdl  9  due 6 mo and failed    Mom      at  80 yrs old    Colon   age 52--due ten yrs    CTA heart    with some plaque    Daughter moved to [facility] recovery etoh    broke up with gf    11-21     Social Determinants of Health     Financial Resource Strain:     Difficulty of Paying Living Expenses: Not on file   Food Insecurity:     Worried About Running Out of Food in the Last Year: Not on file    Mariano of Food in the Last Year: Not on file   Transportation Needs:     Lack of Transportation (Medical): Not on file    Lack of Transportation (Non-Medical): Not on file   Physical Activity:     Days of Exercise per Week: Not on file    Minutes of Exercise per Session: Not on file   Stress:     Feeling of Stress : Not on file   Social Connections:     Frequency of Communication with Friends and Family: Not on file    Frequency of Social Gatherings with Friends and Family: Not on file    Attends Protestant Services: Not on file    Active Member of [address] [place] [address] [employer] or Organizations: Not on file    Attends Club or Organization Meetings: Not on file    Marital Status: Not on file   Intimate Partner Violence:     Fear of Current or Ex-Partner: Not on file    Emotionally Abused: Not on file    Physically Abused: Not on file    Sexually Abused: Not on file   Housing Stability:     Unable to Pay for Housing in the Last Year: Not on file    Number of [place] in the Last Year: Not on file    Unstable Housing in the Last Year: Not on file       Family History   Problem Relation Age of Onset    Other Brother     Heart Attack Brother        Review of Systems   All other systems reviewed and are negative. Objective:  Vitals:    02/01/22 1359   BP: 122/86   Pulse: 79   Resp: 18   Temp: 97.2 °F (36.2 °C)   TempSrc: Temporal   SpO2: 98%   Weight: 158 lb (71.7 kg)   Height: 5' 4\" (1.626 m)          Physical Exam  HENT:      Head: Normocephalic and atraumatic. Eyes:      General:         Right eye: No discharge. Left eye: No discharge. Neck:      Trachea: No tracheal deviation. Cardiovascular:      Rate and Rhythm: Normal rate.    Pulmonary: Effort: Pulmonary effort is normal. No respiratory distress. Abdominal:      General: There is no distension. Palpations: Abdomen is soft. Tenderness: There is no abdominal tenderness. There is no guarding or rebound. Skin:     General: Skin is warm and dry. Neurological:      Mental Status: She is alert and oriented to person, place, and time. Early Severin, MD      NOTE: This report, in part or full,may have been transcribed using voice recognition software. Every effort was made to ensure accuracy; however, inadvertent computerized transcription errors may be present. Please excuse any transcriptional grammatical or spelling errors that may have escaped my editorial review.     CC: Rafal Rodriguez, DO

## 2022-02-04 ENCOUNTER — TELEPHONE (OUTPATIENT)
Dept: SURGERY | Age: 62
End: 2022-02-04

## 2022-02-04 NOTE — TELEPHONE ENCOUNTER
Prior Authorization Form:      DEMOGRAPHICS:                     Patient Name:  Phong Feliz  Patient :  1960            Insurance:  Payor: [employer] / Plan: [employer] [address] / Product Type: *No Product type* /   Insurance ID Number:    Payor/Plan Subscr  Sex Relation Sub. Ins. ID Effective Group Num   1.  MEDICAL Pedro Lam 9/[birth year]/1961 Male Spouse [id number] 20 [id number]                                   [address]         DIAGNOSIS & PROCEDURE:                       Procedure/Operation: EGD/colonoscopy           CPT Code: 80453/65075    Diagnosis:  Dysphagia/Screening    ICD10 Code: R13.10/Z12.11    Location:  [facility]    Surgeon:  Dr Lui Rain INFORMATION:                          Date: 22    Time: 8:30 am             Anesthesia:  [facility]                                                       Status:  Outpatient        Special Comments:         Electronically signed by Yann Mendez MA on 2022 at 2:30 PM

## 2022-02-04 NOTE — TELEPHONE ENCOUNTER
Yi Feliz is scheduled for EGD/colonoscopy with Dr Rufus Jalloh on 03-09-22 at [facility] at 8:30 am. Patient was told to arrive at 7:30 am. Patient needs to be NPO after midnight the night before procedure. All surgery instructions were explained to the patient and a surgery letter was also mailed out. MA informed patient that PAT will also be calling to review pre-op instructions and medications. Patient verbalized understanding.   Electronically signed by Sully Calvin MA on 2/4/2022 at 2:29 PM

## 2022-03-04 NOTE — PROGRESS NOTES

## 2022-03-04 NOTE — PROGRESS NOTES
Ap PRE-ADMISSION TESTING INSTRUCTIONS    The Preadmission Testing patient is instructed accordingly using the following criteria (check applicable):    ARRIVAL INSTRUCTIONS:  [x] Parking the day of Surgery is located in the Main Entrance lot. Upon entering the door, make an immediate right to the surgery reception desk    [x] Bring photo ID and insurance card    [] Bring in a copy of Living will or Durable Power of  papers. [x] Please be sure to arrange for responsible adult to provide transportation to and from the hospital    [x] Please arrange for responsible adult to be with you for the 24 hour period post procedure due to having anesthesia      GENERAL INSTRUCTIONS:    [x] Nothing by mouth after midnight, including gum, candy, mints or water    [x] You may brush your teeth, but do not swallow any water    [] Take medications as instructed with 1-2 oz of water    [x] Stop herbal supplements and vitamins 5 days prior to procedure    [] Follow preop dosing of blood thinners per physician instructions    [] Take 1/2 dose of evening insulin, but no insulin after midnight    [] No oral diabetic medications after midnight    [] If diabetic and have low blood sugar or feel symptomatic, take 1-2oz apple juice only    [] Bring inhalers day of surgery    [] Bring C-PAP/ Bi-Pap day of surgery    [] Bring urine specimen day of surgery    [x] Shower or bath with soap, lather and rinse well, AM of Surgery, no lotion, powders or creams to surgical site    [] Follow bowel prep as instructed per surgeon    [] No tobacco products within 24 hours of surgery     [x] No alcohol or illegal drug use within 24 hours of surgery.     [x] Jewelry, body piercing's, eyeglasses, contact lenses and dentures are not permitted into surgery (bring cases)      [x] Please do not wear any nail polish, make up or hair products on the day of surgery    [x] You can expect a call the business day prior to

## 2022-03-09 ENCOUNTER — ANESTHESIA EVENT (OUTPATIENT)
Dept: ENDOSCOPY | Age: 62
End: 2022-03-09
Payer: COMMERCIAL

## 2022-03-09 ENCOUNTER — HOSPITAL ENCOUNTER (OUTPATIENT)
Dept: GENERAL RADIOLOGY | Age: 62
Discharge: HOME | End: 2022-03-11
Payer: COMMERCIAL

## 2022-03-09 ENCOUNTER — ANESTHESIA (OUTPATIENT)
Dept: ENDOSCOPY | Age: 62
End: 2022-03-09
Payer: COMMERCIAL

## 2022-03-09 ENCOUNTER — HOSPITAL ENCOUNTER (OUTPATIENT)
Age: 62
Setting detail: OUTPATIENT SURGERY
Discharge: HOME | End: 2022-03-09
Attending: SURGERY | Admitting: SURGERY
Payer: COMMERCIAL

## 2022-03-09 VITALS
HEIGHT: 65 IN | OXYGEN SATURATION: 100 % | RESPIRATION RATE: 16 BRPM | BODY MASS INDEX: 25.66 KG/M2 | SYSTOLIC BLOOD PRESSURE: 121 MMHG | WEIGHT: 154 LBS | HEART RATE: 72 BPM | DIASTOLIC BLOOD PRESSURE: 84 MMHG

## 2022-03-09 VITALS
OXYGEN SATURATION: 99 % | RESPIRATION RATE: 17 BRPM | SYSTOLIC BLOOD PRESSURE: 142 MMHG | DIASTOLIC BLOOD PRESSURE: 91 MMHG

## 2022-03-09 PROCEDURE — 6360000002 HC RX W HCPCS: Performed by: NURSE ANESTHETIST, CERTIFIED REGISTERED

## 2022-03-09 PROCEDURE — 3609027000 HC COLONOSCOPY: Performed by: SURGERY

## 2022-03-09 PROCEDURE — 2500000003 HC RX 250 WO HCPCS: Performed by: NURSE ANESTHETIST, CERTIFIED REGISTERED

## 2022-03-09 PROCEDURE — A4641 RADIOPHARM DX AGENT NOC: HCPCS | Performed by: RADIOLOGY

## 2022-03-09 PROCEDURE — 2580000003 HC RX 258: Performed by: NURSE ANESTHETIST, CERTIFIED REGISTERED

## 2022-03-09 PROCEDURE — 7100000011 HC PHASE II RECOVERY - ADDTL 15 MIN: Performed by: SURGERY

## 2022-03-09 PROCEDURE — 2709999900 HC NON-CHARGEABLE SUPPLY: Performed by: SURGERY

## 2022-03-09 PROCEDURE — 3700000000 HC ANESTHESIA ATTENDED CARE: Performed by: SURGERY

## 2022-03-09 PROCEDURE — 6360000004 HC RX CONTRAST MEDICATION: Performed by: RADIOLOGY

## 2022-03-09 PROCEDURE — 6360000002 HC RX W HCPCS: Performed by: ANESTHESIOLOGY

## 2022-03-09 PROCEDURE — 7100000010 HC PHASE II RECOVERY - FIRST 15 MIN: Performed by: SURGERY

## 2022-03-09 PROCEDURE — 3700000001 HC ADD 15 MINUTES (ANESTHESIA): Performed by: SURGERY

## 2022-03-09 PROCEDURE — 74270 X-RAY XM COLON 1CNTRST STD: CPT

## 2022-03-09 PROCEDURE — 45378 DIAGNOSTIC COLONOSCOPY: CPT | Performed by: SURGERY

## 2022-03-09 RX ORDER — LIDOCAINE HYDROCHLORIDE 10 MG/ML
INJECTION, SOLUTION INFILTRATION; PERINEURAL PRN
Status: DISCONTINUED | OUTPATIENT
Start: 2022-03-09 | End: 2022-03-09 | Stop reason: SDUPTHER

## 2022-03-09 RX ORDER — SODIUM CHLORIDE 9 MG/ML
INJECTION, SOLUTION INTRAVENOUS CONTINUOUS PRN
Status: DISCONTINUED | OUTPATIENT
Start: 2022-03-09 | End: 2022-03-09 | Stop reason: SDUPTHER

## 2022-03-09 RX ORDER — MIDAZOLAM HYDROCHLORIDE 2 MG/2ML
1 INJECTION, SOLUTION INTRAMUSCULAR; INTRAVENOUS ONCE
Status: COMPLETED | OUTPATIENT
Start: 2022-03-09 | End: 2022-03-09

## 2022-03-09 RX ORDER — PROPOFOL 10 MG/ML
INJECTION, EMULSION INTRAVENOUS PRN
Status: DISCONTINUED | OUTPATIENT
Start: 2022-03-09 | End: 2022-03-09 | Stop reason: SDUPTHER

## 2022-03-09 RX ADMIN — BARIUM SULFATE 1900 ML: 1.05 SUSPENSION ORAL; RECTAL at 12:47

## 2022-03-09 RX ADMIN — PROPOFOL 150 MG: 10 INJECTION, EMULSION INTRAVENOUS at 08:57

## 2022-03-09 RX ADMIN — PROPOFOL 50 MG: 10 INJECTION, EMULSION INTRAVENOUS at 09:21

## 2022-03-09 RX ADMIN — SODIUM CHLORIDE: 9 INJECTION, SOLUTION INTRAVENOUS at 08:48

## 2022-03-09 RX ADMIN — PROPOFOL 50 MG: 10 INJECTION, EMULSION INTRAVENOUS at 09:12

## 2022-03-09 RX ADMIN — PROPOFOL 50 MG: 10 INJECTION, EMULSION INTRAVENOUS at 09:06

## 2022-03-09 RX ADMIN — LIDOCAINE HYDROCHLORIDE 20 MG: 10 INJECTION, SOLUTION INFILTRATION; PERINEURAL at 08:57

## 2022-03-09 RX ADMIN — PROPOFOL 50 MG: 10 INJECTION, EMULSION INTRAVENOUS at 09:17

## 2022-03-09 RX ADMIN — MIDAZOLAM HYDROCHLORIDE 1 MG: 1 INJECTION, SOLUTION INTRAMUSCULAR; INTRAVENOUS at 08:32

## 2022-03-09 RX ADMIN — PROPOFOL 50 MG: 10 INJECTION, EMULSION INTRAVENOUS at 09:01

## 2022-03-09 NOTE — PROGRESS NOTES
Radiology phoned [facility] and are ready for patient's barium enema. Pt. Escorted to radiology via wheelchair by [facility] staff.

## 2022-03-09 NOTE — H&P
[facility] Surgery Clinic Note     Assessment/Plan:        Diagnosis Orders   1. Dysphagia, unspecified type        We will plan for EGD with possible dilation. If no improvement would consider barium swallow and ultrasound   2. Encounter for screening colonoscopy        Plan for colonoscopy             Return for Endoscopy.             Chief Complaint   Patient presents with    New Patient       colonoscopy and egd         PCP: Hortensia Clement DO     HPI: Mallorie Feliz is a 63 y.o. female who presents in consultation for dysphagia. She said it began at 6 years ago after she had a C5-C6 fusion. Symptoms are intermittent only happening every month or so. She has not had any imaging. She has it it is mostly with meats and rice. She has no issues with swallowing liquids. She says taking smaller bites or chewing does not significantly help her symptoms she says she may have had a remote EGD. She denies any significant reflux. She does have new onset constipation. She is taking Colace now. She is drinking fluids. She says she is always had constipation however with the last year with when her mom  her bowels became normal and just recently returned back to constipation. Increased stress will give her loose stools. She has no family history of colon cancer or inflammatory bowel disease. Last colonoscopy was 10 years that she reports was unremarkable. She denies any melena or hematochezia. There is no family of colon cancer. Her father had polyps.   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 Roberto George Medications           Prior to Admission medications    Medication Sig Start Date End Date Taking? Authorizing Provider   aspirin EC 81 MG EC tablet Take 1 tablet by mouth daily 21   Yes Reed Mcmullen, DO   Red Yeast Rice Extract (RED YEAST RICE PO) Take by mouth     Yes Historical Provider, MD   Omega-3 Fatty Acids (FISH OIL OMEGA-3 PO) Take 1 capsule by mouth     Yes Historical Provider, MD   vitamin D (CHOLECALCIFEROL) 1000 UNITS TABS tablet Take 1,000 Units by mouth daily     Yes Historical Provider, MD            No Known Allergies     Social History   Social History            Socioeconomic History    Marital status:        Spouse name: None    Number of children: None    Years of education: None    Highest education level: None   Occupational History    None   Tobacco Use    Smoking status: Never Smoker    Smokeless tobacco: Never Used   Substance and Sexual Activity    Alcohol use: Yes       Comment: socially    Drug use: No    Sexual activity: None   Other Topics Concern    None   Social History Narrative     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       is a  at [address]- boy 31 girl 34 girl 20---------------------------one nabeel moved to [place]      10-21     Father  from CHF and Parkinson's disease.     Family history of CAD and a brother who had 2 myocardial infarction as before 61     sis.  [employer]     Hyperlipidemia------------------------------------------start statin       Elevated blood pressure 6-20--in office    great at home     white coat HTN     Chronic low back pain with lumbar disc into the right leg---mri     With  djd  And   Lesion with neg bone [facility] at [facility]     Cervical disc surgery 2016 with      Uterine ablation     Myalgias     Elev Chol with Exam  HENT:      Head: Normocephalic and atraumatic. Eyes:      General:         Right eye: No discharge. Left eye: No discharge. Neck:      Trachea: No tracheal deviation. Cardiovascular:      Rate and Rhythm: Normal rate. Pulmonary:      Effort: Pulmonary effort is normal. No respiratory distress. Abdominal:      General: There is no distension. Palpations: Abdomen is soft. Tenderness: There is no abdominal tenderness. There is no guarding or rebound. Skin:     General: Skin is warm and dry. Neurological:      Mental Status: She is alert and oriented to person, place, and time.                      Jessee Magallon MD        NOTE: This report, in part or full,may have been transcribed using voice recognition software. Every effort was made to ensure accuracy; however, inadvertent computerized transcription errors may be present. Please excuse any transcriptional grammatical or spelling errors that may have escaped my editorial review.     CC: Reed Mcmullen,      Addendum: Patient changed her mind and does not want to pursue an EGD.   Plan for colonoscopy only

## 2022-03-09 NOTE — ANESTHESIA PRE PROCEDURE
Department of Anesthesiology  Preprocedure Note       Name:  Zoila Feliz   Age:  63 y.o.  :  1960                                          MRN:  [id number]         Date:  3/9/2022      Surgeon: Ok Floor):  Darline Buck MD    Procedure: Procedure(s):  COLORECTAL CANCER SCREENING, NOT HIGH RISK  EGD ESOPHAGOGASTRODUODENOSCOPY    Medications prior to admission:   Prior to Admission medications    Medication Sig Start Date End Date Taking?  Authorizing Provider   Red Yeast Rice Extract (RED YEAST RICE PO) Take by mouth   Yes Historical Provider, MD   Omega-3 Fatty Acids (FISH OIL OMEGA-3 PO) Take 1 capsule by mouth   Yes Historical Provider, MD   vitamin D (CHOLECALCIFEROL) 1000 UNITS TABS tablet Take 1,000 Units by mouth daily   Yes Historical Provider, MD       Current medications:    Current Facility-Administered Medications   Medication Dose Route Frequency Provider Last Rate Last Admin    midazolam PF (VERSED) injection 1 mg  1 mg IntraVENous Once Tammie Caballero MD           Allergies:  No Known Allergies    Problem List:    Patient Active Problem List   Diagnosis Code    Neck pain M54.2    Essential hypertension I10    Mixed hyperlipidemia E78.2    Myalgia M79.10    Factor V Leiden ([facility] [address] D68.51    Atherosclerosis I70.90    Other dysphagia R13.19       Past Medical History:        Diagnosis Date    Hyperlipidemia     diet only    Screening for colon cancer     For colonoscopy 3-9-22       Past Surgical History:        Procedure Laterality Date    CERVICAL DISCECTOMY  2017    anterior cervical discectomy and fusion C5-C6 with Dr. Filipe Merritt         Social History:    Social History     Tobacco Use    Smoking status: Never Smoker    Smokeless tobacco: Never Used   Substance Use Topics    Alcohol use: Yes     Comment: socially                                Counseling given: Not Answered      Vital Signs (Current):   Vitals: 03/04/22 1501 03/09/22 0800   BP:  125/72   Pulse:  88   Resp:  16   SpO2:  100%   Weight: 154 lb (69.9 kg)    Height: 5' 5\" (1.651 m)                                               BP Readings from Last 3 Encounters:   03/09/22 125/72   02/01/22 122/86   01/28/22 114/78       NPO Status: Time of last liquid consumption: 2200                        Time of last solid consumption: 1800                        Date of last liquid consumption: 03/08/22                        Date of last solid food consumption: 03/07/22    BMI:   Wt Readings from Last 3 Encounters:   03/04/22 154 lb (69.9 kg)   02/01/22 158 lb (71.7 kg)   01/28/22 160 lb (72.6 kg)     Body mass index is 25.63 kg/m². CBC:   Lab Results   Component Value Date    WBC 5.1 01/07/2022    RBC 4.28 01/07/2022    HGB 12.9 01/07/2022    HCT 39.6 01/07/2022    MCV 92.5 01/07/2022    RDW 12.8 01/07/2022     01/07/2022       CMP:   Lab Results   Component Value Date     01/07/2022    K 3.7 01/07/2022     01/07/2022    CO2 20 01/07/2022    BUN 15 01/07/2022    CREATININE 0.7 01/07/2022    GFRAA >60 01/07/2022    LABGLOM >60 01/07/2022    GLUCOSE 85 01/07/2022    PROT 7.3 01/07/2022    CALCIUM 9.5 01/07/2022    BILITOT 0.4 01/07/2022    ALKPHOS 41 01/07/2022    AST 18 01/07/2022    ALT 15 01/07/2022       POC Tests: No results for input(s): POCGLU, POCNA, POCK, POCCL, POCBUN, POCHEMO, POCHCT in the last 72 hours.     Coags:   Lab Results   Component Value Date    PROTIME 11.0 01/07/2022    INR 1.0 01/07/2022    APTT 28.7 01/07/2022       HCG (If Applicable):   Lab Results   Component Value Date    PREGTESTUR NEGATIVE 10/10/2014        ABGs: No results found for: PHART, PO2ART, FBC5YIK, UFV0FSV, BEART, C6EZILMN     Type & Screen (If Applicable):  No results found for: LABABO, LABRH    Drug/Infectious Status (If Applicable):  No results found for: HIV, HEPCAB    COVID-19 Screening (If Applicable): No results found for: COVID19        Anesthesia Evaluation  Patient summary reviewed no history of anesthetic complications:   Airway: Mallampati: I  TM distance: >3 FB   Neck ROM: full  Mouth opening: > = 3 FB Dental: normal exam         Pulmonary:Negative Pulmonary ROS breath sounds clear to auscultation                             Cardiovascular:    (+) hypertension:, hyperlipidemia      ECG reviewed  Rhythm: regular  Rate: normal                    Neuro/Psych:   Negative Neuro/Psych ROS              GI/Hepatic/Renal:   (+) bowel prep,      (-) liver disease and no renal disease       Endo/Other:    (+) blood dyscrasia: Factor V:., .    (-) diabetes mellitus               Abdominal:         (-) obese       Vascular: negative vascular ROS. Other Findings:             Anesthesia Plan      MAC     ASA 2       Induction: intravenous. MIPS: Prophylactic antiemetics administered. Anesthetic plan and risks discussed with patient and spouse. Plan discussed with CRNA. DOS STAFF ADDENDUM:    Patient seen and examined, physical exam updated as needed, chart reviewed. NPO status confirmed. Anesthetic options and risks discussed with patient/legal guardian. Patient/legal guardian verbalized understanding and agrees to proceed.      BRENT Joseph - CRNA  Staff CRNA  March 9, 2022  8:36 AM                      Boogie Monroy MD   3/9/2022

## 2022-03-09 NOTE — OP NOTE
Colonoscopy Op Note  PATIENT: Melody Feliz    DATE OF PROCEDURE: 3/9/2022    SURGEON: Luther Alvarado MD    PREOPERATIVE DIAGNOSIS: Screening colonoscopy, chronic constipation    POSTOPERATIVE DIAGNOSIS: Same, extremely tortuous and redundant long colon. The scope was inserted completely several times and despite multiple maneuvers and positionings cannot reach cecum before running out of length of scope. OPERATION: Procedure(s):  COLORECTAL CANCER SCREENING, NOT HIGH RISK      ANESTHESIA: Local monitored anesthesia. ESTIMATED BLOOD LOSS: nil     COMPLICATIONS: None. SPECIMENS:   * No specimens in log *    HISTORY: The patient is a 63y.o. year old female with history of above preop diagnosis. I recommended colonoscopy with possible biopsy or polypectomy and I explained the risk, benefits, expected outcome, and alternatives to the procedure. Risks included but are not limited to bleeding, infection, respiratory distress, hypotension, and perforation of the colon. The patient understands and is in agreement. PROCEDURE: The patient was given IV conscious sedation per anesthesia. The patient was given supplemental oxygen by nasal cannula. The colonoscope was inserted per rectum and advanced under direct vision with difficulty due to significant tortuosity and redundancy of the colonThe prep was good. FINDINGS:    MIKE: normal    Terminal Ileum: not examined    Colon: Extremely long, tortuous, and redundant colon and despite multiple maneuvers and positioning we cannot reach the cecum before running out of length of scope, presumably examined to hepatic flexure and possibly distal right colon    Rectum/Anus: examined in normal and retroflexed positions - normal    The colon was decompressed and the scope was removed. The patient tolerated the procedure well. ASSESSMENT/PLAN:   1.  Barium enema      Luther Alvarado MD  03/09/22  9:29 AM

## 2022-03-09 NOTE — PROGRESS NOTES
Discharge instructions given to patient and . Both verbalize understanding. Awaiting transport to Xray for ordered barium enema.

## 2022-03-09 NOTE — ANESTHESIA POSTPROCEDURE EVALUATION
Department of Anesthesiology  Postprocedure Note    Patient: Mia Velez  MRN: [id number]  YOB: 1960  Date of evaluation: 3/9/2022  Time:  1:13 PM     Procedure Summary     Date: 03/09/22 Room / Location: [place] [address] / [facility]    Anesthesia Start: 9194 Anesthesia Stop: 1980    Procedure: COLORECTAL CANCER SCREENING, NOT HIGH RISK (N/A ) Diagnosis: (DYSPHAGIA AND SCREENING)    Surgeons: Joceline Rojas MD Responsible Provider: Brittany Baldwin MD    Anesthesia Type: MAC ASA Status: 2          Anesthesia Type: MAC    Evelyn Phase I: Evelyn Score: 10    Evelyn Phase II: Evelyn Score: 10    Last vitals: Reviewed and per EMR flowsheets.        Anesthesia Post Evaluation    Patient location during evaluation: PACU  Level of consciousness: awake and alert  Airway patency: patent  Nausea & Vomiting: no vomiting  Complications: no  Cardiovascular status: hemodynamically stable  Respiratory status: acceptable  Hydration status: stable

## 2022-03-18 ENCOUNTER — TELEPHONE (OUTPATIENT)
Dept: SURGERY | Age: 62
End: 2022-03-18

## 2022-03-18 NOTE — TELEPHONE ENCOUNTER
Have her take miralax twice a day. Can try dulcolax suppository.  If no better then can try milk of mag or magnesium citrate

## 2022-03-18 NOTE — TELEPHONE ENCOUNTER
Patient called in regarding her colonoscopy on 3/9/22. Patient also had a barium enema. Patient states she has horrible constipation. Did have a small bowel movement that was very painful. Is taking colace 3x a day. Would like to know if you could recommend something else for her? Follow up appointment is on 3/29/22.      Electronically signed by Dasha Munson on 3/18/2022 at 12:59 PM

## 2022-03-18 NOTE — TELEPHONE ENCOUNTER
Patient advised of doctors orders.    Electronically signed by Meghana Marquez on 3/18/2022 at 2:52 PM

## 2022-03-29 ENCOUNTER — OFFICE VISIT (OUTPATIENT)
Dept: SURGERY | Age: 62
End: 2022-03-29
Payer: COMMERCIAL

## 2022-03-29 VITALS
SYSTOLIC BLOOD PRESSURE: 138 MMHG | BODY MASS INDEX: 25.83 KG/M2 | TEMPERATURE: 97.2 F | DIASTOLIC BLOOD PRESSURE: 89 MMHG | HEIGHT: 65 IN | RESPIRATION RATE: 18 BRPM | HEART RATE: 79 BPM | WEIGHT: 155 LBS | OXYGEN SATURATION: 98 %

## 2022-03-29 DIAGNOSIS — K59.00 CONSTIPATION, UNSPECIFIED CONSTIPATION TYPE: Primary | ICD-10-CM

## 2022-03-29 PROCEDURE — 3017F COLORECTAL CA SCREEN DOC REV: CPT | Performed by: SURGERY

## 2022-03-29 PROCEDURE — G8427 DOCREV CUR MEDS BY ELIG CLIN: HCPCS | Performed by: SURGERY

## 2022-03-29 PROCEDURE — G8484 FLU IMMUNIZE NO ADMIN: HCPCS | Performed by: SURGERY

## 2022-03-29 PROCEDURE — G8419 CALC BMI OUT NRM PARAM NOF/U: HCPCS | Performed by: SURGERY

## 2022-03-29 PROCEDURE — 1036F TOBACCO NON-USER: CPT | Performed by: SURGERY

## 2022-03-29 PROCEDURE — 99213 OFFICE O/P EST LOW 20 MIN: CPT | Performed by: SURGERY

## 2022-03-29 NOTE — PROGRESS NOTES
[facility] Surgery Clinic Note    Assessment/Plan:      Diagnosis Orders   1. Constipation, unspecified constipation type  linaclotide (LINZESS) 145 MCG capsule    Trial Linzess. Repeat colonoscopy 5 years. Return in about 3 months (around 6/29/2022). Chief Complaint   Patient presents with    Results     egd/colonoscopy        PCP: Jaret Sandhu DO    HPI: Oscar Feliz is a 63 y.o. female who presents in follow-up for EGD and colonoscopy. Colonoscopy was incomplete as she had a very long and tortuous colon due to her chronic constipation. Her barium enema did show a very tortuous and elongated redundant colon as well and apparently had trouble to get the contrast all the way to the cecum which they did eventually succeed. She still has issues with constipation. She takes Colace 3 times daily. She has some occasional abdominal discomfort. She is severely constipated. Otherwise there is no significant abdominal pain. There is no blood in her stools. She has no nausea. Past Medical History:   Diagnosis Date    Hyperlipidemia     diet only    Screening for colon cancer     For colonoscopy 3-9-22       Past Surgical History:   Procedure Laterality Date    CERVICAL DISCECTOMY  02/06/2017    anterior cervical discectomy and fusion C5-C6 with Dr. Joan Marquez COLONOSCOPY N/A 3/9/2022    COLORECTAL CANCER SCREENING, NOT HIGH RISK performed by Humberto Wiley MD at [address] [place] [address]         Prior to Admission medications    Medication Sig Start Date End Date Taking?  Authorizing Provider   linaclotide Zahira Montero) 145 MCG capsule Take 1 capsule by mouth every morning (before breakfast) 3/29/22  Yes Humberto Wiley MD   Red Yeast Rice Extract (RED YEAST RICE PO) Take by mouth   Yes Historical Provider, MD   Omega-3 Fatty Acids (FISH OIL OMEGA-3 PO) Take 1 capsule by mouth   Yes Historical Provider, MD   vitamin D (CHOLECALCIFEROL) 1000 UNITS TABS tablet Take 1,000 Units by mouth daily   Yes Historical Provider, MD       No Known Allergies    Social History     Socioeconomic History    Marital status:      Spouse name: None    Number of children: None    Years of education: None    Highest education level: None   Occupational History    None   Tobacco Use    Smoking status: Never Smoker    Smokeless tobacco: Never Used   Substance and Sexual Activity    Alcohol use: Yes     Comment: socially    Drug use: No    Sexual activity: None   Other Topics Concern    None   Social History Narrative    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   is a  at [employer]- boy 29 girl 34 girl 20---------------------------one nabeel moved to [place]      10-21    Father  from CHF and Parkinson's disease. Family history of CAD and a brother who had 2 myocardial infarction as before 61    sis. Daralene Drilling    Hyperlipidemia------------------------------------------start statin  13    Elevated blood pressure 6--in office    great at home     white coat HTN    Chronic low back pain with lumbar disc into the right leg---mri   2018  With  djd  And   Lesion with neg [facility] at [facility]    Cervical disc surgery 2016 with     Uterine ablation    Myalgias    Elev Chol with high  hdl  7-20  due 6 mo and failed    Mom      at  80 yrs old    Colon   age 52--due ten yrs    CTA heart    with some plaque    Daughter moved to [place]    Son  recovery etoh    broke up with gf         Social Determinants of Health     Financial Resource Strain:     Difficulty of Paying Living Expenses: Not on file   Food Insecurity:     Worried About Running Out of Food in the Last Year: Not on file    Mariano of Food in the Last Year: Not on file   Transportation Needs:     Lack of Transportation (Medical):  Not on file    Lack of Transportation (Non-Medical): Not on file   Physical Activity:     Days of Exercise per Week: Not on file    Minutes of Exercise per Session: Not on file   Stress:     Feeling of Stress : Not on file   Social Connections:     Frequency of Communication with Friends and Family: Not on file    Frequency of Social Gatherings with Friends and Family: Not on file    Attends Mandaeism Services: Not on file    Active Member of [address] [place] [address] or Organizations: Not on file    Attends Club or Organization Meetings: Not on file    Marital Status: Not on file   Intimate Partner Violence:     Fear of Current or Ex-Partner: Not on file    Emotionally Abused: Not on file    Physically Abused: Not on file    Sexually Abused: Not on file   Housing Stability:     Unable to Pay for Housing in the Last Year: Not on file    Number of [place] in the Last Year: Not on file    Unstable Housing in the Last Year: Not on file       Family History   Problem Relation Age of Onset    Other Brother     Heart Attack Brother        Review of Systems   All other systems reviewed and are negative. Objective:  Vitals:    03/29/22 1511   BP: 138/89   Pulse: 79   Resp: 18   Temp: 97.2 °F (36.2 °C)   TempSrc: Temporal   SpO2: 98%   Weight: 155 lb (70.3 kg)   Height: 5' 5\" (1.651 m)          Physical Exam  Constitutional:       General: She is not in acute distress. Appearance: She is not diaphoretic. Cardiovascular:      Rate and Rhythm: Normal rate. Pulmonary:      Effort: Pulmonary effort is normal. No respiratory distress. Abdominal:      General: There is no distension. Palpations: Abdomen is soft. Tenderness: There is no abdominal tenderness. There is no guarding or rebound. Tomasa Fajardo MD      NOTE: This report, in part or full,may have been transcribed using voice recognition software.  Every effort was made to ensure accuracy; however, inadvertent computerized transcription errors may be present. Please excuse any transcriptional grammatical or spelling errors that may have escaped my editorial review.     CC: Daljit Christine, DO

## 2022-06-01 ENCOUNTER — OFFICE VISIT (OUTPATIENT)
Dept: FAMILY MEDICINE CLINIC | Age: 62
End: 2022-06-01
Payer: COMMERCIAL

## 2022-06-01 VITALS
BODY MASS INDEX: 25.83 KG/M2 | HEART RATE: 59 BPM | SYSTOLIC BLOOD PRESSURE: 124 MMHG | TEMPERATURE: 97.1 F | DIASTOLIC BLOOD PRESSURE: 78 MMHG | HEIGHT: 65 IN | WEIGHT: 155 LBS | OXYGEN SATURATION: 97 %

## 2022-06-01 DIAGNOSIS — U07.1 COVID-19: Primary | ICD-10-CM

## 2022-06-01 PROCEDURE — G8419 CALC BMI OUT NRM PARAM NOF/U: HCPCS | Performed by: FAMILY MEDICINE

## 2022-06-01 PROCEDURE — 1036F TOBACCO NON-USER: CPT | Performed by: FAMILY MEDICINE

## 2022-06-01 PROCEDURE — 99213 OFFICE O/P EST LOW 20 MIN: CPT | Performed by: FAMILY MEDICINE

## 2022-06-01 PROCEDURE — 3017F COLORECTAL CA SCREEN DOC REV: CPT | Performed by: FAMILY MEDICINE

## 2022-06-01 PROCEDURE — G8428 CUR MEDS NOT DOCUMENT: HCPCS | Performed by: FAMILY MEDICINE

## 2022-06-01 RX ORDER — AZITHROMYCIN 250 MG/1
250 TABLET, FILM COATED ORAL SEE ADMIN INSTRUCTIONS
Qty: 6 TABLET | Refills: 0 | Status: SHIPPED | OUTPATIENT
Start: 2022-06-01 | End: 2022-06-06

## 2022-06-01 RX ORDER — METHYLPREDNISOLONE 4 MG/1
TABLET ORAL
Qty: 1 KIT | Refills: 0 | Status: SHIPPED
Start: 2022-06-01 | End: 2022-09-21

## 2022-06-01 ASSESSMENT — PATIENT HEALTH QUESTIONNAIRE - PHQ9
SUM OF ALL RESPONSES TO PHQ QUESTIONS 1-9: 0
1. LITTLE INTEREST OR PLEASURE IN DOING THINGS: 0
2. FEELING DOWN, DEPRESSED OR HOPELESS: 0
SUM OF ALL RESPONSES TO PHQ QUESTIONS 1-9: 0
SUM OF ALL RESPONSES TO PHQ9 QUESTIONS 1 & 2: 0

## 2022-06-01 ASSESSMENT — ENCOUNTER SYMPTOMS
GASTROINTESTINAL NEGATIVE: 1
ALLERGIC/IMMUNOLOGIC NEGATIVE: 1
COUGH: 1
SORE THROAT: 1
EYES NEGATIVE: 1

## 2022-06-01 NOTE — PROGRESS NOTES
22  Name: Aly Cheungbrayan    : 1960    Sex: female    Age: 63 y.o. Subjective:  Chief Complaint: Patient is here for  Sore throat  couhg     Did  covid this am and positive. Had symptoms 2 days ago. Has been also positive. Review of Systems   Constitutional: Negative. Negative for chills, diaphoresis, fatigue and fever. HENT: Positive for postnasal drip and sore throat. Eyes: Negative. Respiratory: Positive for cough. Cardiovascular: Negative. Gastrointestinal: Negative. Endocrine: Negative. Genitourinary: Negative. Musculoskeletal: Negative. Skin: Negative. Allergic/Immunologic: Negative. Neurological: Negative. Hematological: Negative. Psychiatric/Behavioral: Negative.           Current Outpatient Medications:     azithromycin (ZITHROMAX) 250 MG tablet, Take 1 tablet by mouth See Admin Instructions for 5 days 500mg on day 1 followed by 250mg on days 2 - 5, Disp: 6 tablet, Rfl: 0    methylPREDNISolone (MEDROL DOSEPACK) 4 MG tablet, Take by mouth., Disp: 1 kit, Rfl: 0    linaclotide (LINZESS) 145 MCG capsule, Take 1 capsule by mouth every morning (before breakfast), Disp: 30 capsule, Rfl: 3    Red Yeast Rice Extract (RED YEAST RICE PO), Take by mouth, Disp: , Rfl:     Omega-3 Fatty Acids (FISH OIL OMEGA-3 PO), Take 1 capsule by mouth, Disp: , Rfl:     vitamin D (CHOLECALCIFEROL) 1000 UNITS TABS tablet, Take 1,000 Units by mouth daily, Disp: , Rfl:   No Known Allergies  Social History     Socioeconomic History    Marital status:      Spouse name: Not on file    Number of children: Not on file    Years of education: Not on file    Highest education level: Not on file   Occupational History    Not on file   Tobacco Use    Smoking status: Never Smoker    Smokeless tobacco: Never Used   Substance and Sexual Activity    Alcohol use: Yes     Comment: socially    Drug use: No    Sexual activity: Not on file   Other Topics Concern    Not on file   Social History Narrative    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   is a  at [employer]- boy 29 girl 34 girl 20---------------------------one nabeel moved to [place]      10-21    Father  from CHF and Parkinson's disease. Family history of CAD and a brother who had 2 myocardial infarction as before 61    sis. Vearl Calamity    Hyperlipidemia------------------------------------------start statin      Elevated blood pressure --in office    great at home     white coat HTN    Chronic low back pain with lumbar disc into the right leg---mri     With  djd  And   Lesion with neg bone scan at [facility]    Cervical disc surgery 2016 with     Uterine ablation    Myalgias    Elev Chol with high  hdl    due 6 mo and failed    Mom      at  80 yrs old    Colon   age 52--due ten yrs    CTA heart    with some plaque    Daughter moved to [place]    Son  recovery etoh    broke up with gf         Social Determinants of Health     Financial Resource Strain:     Difficulty of Paying Living Expenses: Not on file   Food Insecurity:     Worried About Running Out of Food in the Last Year: Not on file    Mariano of Food in the Last Year: Not on file   Transportation Needs:     Lack of Transportation (Medical): Not on file    Lack of Transportation (Non-Medical):  Not on file   Physical Activity:     Days of Exercise per Week: Not on file    Minutes of Exercise per Session: Not on file   Stress:     Feeling of Stress : Not on file   Social Connections:     Frequency of Communication with Friends and Family: Not on file    Frequency of Social Gatherings with Friends and Family: Not on file    Attends Orthodox Services: Not on file    Active Member of Clubs or Organizations: Not on file    Attends Club or Organization Meetings: Not on file    Marital Status: Not on file   Intimate Partner Violence:     Fear of Current or Ex-Partner: Not on file    Emotionally Abused: Not on file    Physically Abused: Not on file    Sexually Abused: Not on file   Housing Stability:     Unable to Pay for Housing in the Last Year: Not on file    Number of [place] in the Last Year: Not on file    Unstable Housing in the Last Year: Not on file      Past Medical History:   Diagnosis Date    Hyperlipidemia     diet only    Screening for colon cancer     For colonoscopy 3-9-22     Family History   Problem Relation Age of Onset    Other Brother     Heart Attack Brother       Past Surgical History:   Procedure Laterality Date    CERVICAL DISCECTOMY  02/06/2017    anterior cervical discectomy and fusion C5-C6 with Dr. Snehal Barajas 3/9/2022    COLORECTAL [facility], NOT HIGH RISK performed by Tomasa Fajardo MD at [facility]:    06/01/22 0849   BP: 124/78   Pulse: 59   Temp: 97.1 °F (36.2 °C)   TempSrc: Infrared   SpO2: 97%   Weight: 155 lb (70.3 kg)   Height: 5' 5\" (1.651 m)       Objective:    Physical Exam  Vitals reviewed. Constitutional:       Appearance: She is well-developed. HENT:      Head: Normocephalic. Eyes:      Pupils: Pupils are equal, round, and reactive to light. Cardiovascular:      Rate and Rhythm: Normal rate and regular rhythm. Pulmonary:      Effort: Pulmonary effort is normal.      Breath sounds: Normal breath sounds. Abdominal:      Palpations: Abdomen is soft. Musculoskeletal:         General: Normal range of motion. Cervical back: Normal range of motion. Skin:     General: Skin is warm. Neurological:      Mental Status: She is alert and oriented to person, place, and time. Psychiatric:         Behavior: Behavior normal.         Nelli Moreno was seen today for positive for covid-19, cough, congestion and other.     Diagnoses and all orders for this visit:    COVID-19  -     azithromycin (ZITHROMAX) 250 MG tablet; Take 1 tablet by mouth See Admin Instructions for 5 days 500mg on day 1 followed by 250mg on days 2 - 5  -     methylPREDNISolone (MEDROL DOSEPACK) 4 MG tablet; Take by mouth. Comments: Meds prescribed. Any symptoms worsen notify. Call in 2 days with progress      Z-Sony prescribed. Over-the-counter zinc and vitamin C. Purchase an oximeter and call if oxygen saturation less than 90%. If any temperature over 102 degree, shortness of breath,  chest pain go to the emergency room. Complete isolation until results are back from the Covid testing. Do not work until results are back. A great deal of time spent reviewing medications, diet, exercise, social issues. Also reviewing the chart before entering the room with patient and finishing charting after leaving patient's room. More than half of that time was spent face to face with the patient in counseling and coordinating care. I educated the patient about all medications. Make sure they were correct and educated  on the risk associated with missing meds or taking them incorrectly. A list of medications is being sent home with patient today. I informed patient about the risk associated with noncompliance of medication and taking medications incorrectly. Appropriate follow-up with myself and all specialist.  Encourage family members to take active role in assisting with medications and medical care. If any confusion should develop to notify my office immediately to avoid risk of worsening medical condition    A great deal of time spent reviewing medications, diet, exercise, social issues. Also reviewing the chart before entering the room with patient and finishing charting after leaving patient's room. More than half of that time was spent face to face with the patient in counseling and coordinating care. Follow Up: Return for Reg Appt.      Seen by:  Anali Adair DO

## 2022-06-03 ENCOUNTER — TELEPHONE (OUTPATIENT)
Dept: PRIMARY CARE CLINIC | Age: 62
End: 2022-06-03

## 2022-06-03 DIAGNOSIS — U07.1 COVID-19: Primary | ICD-10-CM

## 2022-06-03 RX ORDER — PROMETHAZINE HYDROCHLORIDE AND CODEINE PHOSPHATE 6.25; 1 MG/5ML; MG/5ML
5 SYRUP ORAL EVERY 4 HOURS PRN
Qty: 60 ML | Refills: 0 | Status: SHIPPED | OUTPATIENT
Start: 2022-06-03 | End: 2022-06-06

## 2022-06-03 NOTE — TELEPHONE ENCOUNTER
Notify patient I sent cough medicine with codeine. .  We never put refills on it. Tell her Benadryl would be the safest and strongest thing to take every 4 hours as needed to dry her up.   If worse go to ER

## 2022-06-03 NOTE — TELEPHONE ENCOUNTER
Seen on Wednesday and is Covid +. Asking if you could prescribe something for her cough- something with Codeine. Coughing spells when laying down. Pulse ox has been 98%. Will get a tickle in her throat and then will not be able to stop coughing. Also has severe allergies this time of the year. Already taking Claritin and Flonase. Eyes itchy, throat itchy- this year is worse than others. Asking if there is anything stronger for her allergies that she can take.    [facility] [address] [place].

## 2022-07-05 ENCOUNTER — OFFICE VISIT (OUTPATIENT)
Dept: SURGERY | Age: 62
End: 2022-07-05
Payer: COMMERCIAL

## 2022-07-05 VITALS
OXYGEN SATURATION: 98 % | WEIGHT: 158 LBS | HEART RATE: 77 BPM | RESPIRATION RATE: 18 BRPM | SYSTOLIC BLOOD PRESSURE: 132 MMHG | BODY MASS INDEX: 26.33 KG/M2 | DIASTOLIC BLOOD PRESSURE: 83 MMHG | TEMPERATURE: 98 F | HEIGHT: 65 IN

## 2022-07-05 DIAGNOSIS — K59.00 CONSTIPATION, UNSPECIFIED CONSTIPATION TYPE: Primary | ICD-10-CM

## 2022-07-05 PROCEDURE — 99213 OFFICE O/P EST LOW 20 MIN: CPT | Performed by: SURGERY

## 2022-07-05 PROCEDURE — G8419 CALC BMI OUT NRM PARAM NOF/U: HCPCS | Performed by: SURGERY

## 2022-07-05 PROCEDURE — G8427 DOCREV CUR MEDS BY ELIG CLIN: HCPCS | Performed by: SURGERY

## 2022-07-05 PROCEDURE — 3017F COLORECTAL CA SCREEN DOC REV: CPT | Performed by: SURGERY

## 2022-07-05 PROCEDURE — 1036F TOBACCO NON-USER: CPT | Performed by: SURGERY

## 2022-07-05 NOTE — PROGRESS NOTES
[address] [facility] Note    Assessment/Plan:     Diagnosis Orders   1. Constipation, unspecified constipation type      Doing better with her regimen. Continue. Repeat colonoscopy in 5 years. Return if symptoms worsen or fail to improve. Chief Complaint   Patient presents with    Follow-up     3 mothes follow up        PCP: Nilsa Campuzano DO    HPI: Harshad Feliz is a 63 y.o. female here for follow-up of constipation. She states she does not want take Linzess other stool softener medications so she did her own research. \"  She has been taking a \"bio cleanse\" that has magnesium in it as well as a probiotic. She says that has actually been doing better for her. She has a bowel movement a couple of times a day. She denies any melena or hematochezia. She does not have any further bloating or abdominal discomfort. She denies any nausea or vomiting. Review of Systems   All other systems reviewed and are negative. The remainder of the past medical, past surgical, family, and psychosocial history, as well as medication and allergy review, were completed and are as documented elsewhere in the chart. Objective:  Vitals:    07/05/22 1455   BP: 132/83   Pulse: 77   Resp: 18   Temp: 98 °F (36.7 °C)   SpO2: 98%   Weight: 158 lb (71.7 kg)   Height: 5' 5\" (1.651 m)          Physical Exam  Constitutional:       General: She is not in acute distress. Appearance: She is not diaphoretic. Cardiovascular:      Rate and Rhythm: Normal rate. Pulmonary:      Effort: Pulmonary effort is normal. No respiratory distress. Abdominal:      General: There is no distension. Palpations: Abdomen is soft. Tenderness: There is no abdominal tenderness. There is no guarding or rebound. Kris Coleman MD      NOTE: This report, in part or full, may have been transcribed using voice recognition software.  Every effort was made to ensure accuracy; however, inadvertent computerized transcription errors may be present. Please excuse any transcriptional grammatical or spelling errors that may have escaped my editorial review.       CC: Frank Winchester, DO

## 2022-09-20 ENCOUNTER — HOSPITAL ENCOUNTER (EMERGENCY)
Age: 62
Discharge: HOME | End: 2022-09-20
Attending: STUDENT IN AN ORGANIZED HEALTH CARE EDUCATION/TRAINING PROGRAM
Payer: COMMERCIAL

## 2022-09-20 ENCOUNTER — APPOINTMENT (OUTPATIENT)
Dept: GENERAL RADIOLOGY | Age: 62
End: 2022-09-20
Payer: COMMERCIAL

## 2022-09-20 VITALS
OXYGEN SATURATION: 100 % | TEMPERATURE: 97.9 F | RESPIRATION RATE: 20 BRPM | DIASTOLIC BLOOD PRESSURE: 91 MMHG | SYSTOLIC BLOOD PRESSURE: 133 MMHG | HEART RATE: 99 BPM
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
## 2022-09-20 PROCEDURE — 99284 EMERGENCY DEPT VISIT MOD MDM: CPT

## 2022-09-20 PROCEDURE — 6360000002 HC RX W HCPCS: Performed by: STUDENT IN AN ORGANIZED HEALTH CARE EDUCATION/TRAINING PROGRAM

## 2022-09-20 PROCEDURE — 2500000003 HC RX 250 WO HCPCS: Performed by: STUDENT IN AN ORGANIZED HEALTH CARE EDUCATION/TRAINING PROGRAM

## 2022-09-20 PROCEDURE — 6370000000 HC RX 637 (ALT 250 FOR IP): Performed by: STUDENT IN AN ORGANIZED HEALTH CARE EDUCATION/TRAINING PROGRAM

## 2022-09-20 PROCEDURE — 96372 THER/PROPH/DIAG INJ SC/IM: CPT

## 2022-09-20 PROCEDURE — 73502 X-RAY EXAM HIP UNI 2-3 VIEWS: CPT

## 2022-09-20 PROCEDURE — 73590 X-RAY EXAM OF LOWER LEG: CPT

## 2022-09-20 PROCEDURE — 90714 TD VACC NO PRESV 7 YRS+ IM: CPT | Performed by: STUDENT IN AN ORGANIZED HEALTH CARE EDUCATION/TRAINING PROGRAM

## 2022-09-20 PROCEDURE — 90471 IMMUNIZATION ADMIN: CPT | Performed by: STUDENT IN AN ORGANIZED HEALTH CARE EDUCATION/TRAINING PROGRAM

## 2022-09-20 RX ORDER — HYDROCODONE BITARTRATE AND ACETAMINOPHEN 5; 325 MG/1; MG/1
1 TABLET ORAL ONCE
Status: COMPLETED | OUTPATIENT
Start: 2022-09-20 | End: 2022-09-20

## 2022-09-20 RX ORDER — LORAZEPAM 0.5 MG/1
0.5 TABLET ORAL ONCE
Status: COMPLETED | OUTPATIENT
Start: 2022-09-20 | End: 2022-09-20

## 2022-09-20 RX ORDER — AMOXICILLIN AND CLAVULANATE POTASSIUM 875; 125 MG/1; MG/1
1 TABLET, FILM COATED ORAL 2 TIMES DAILY
Qty: 20 TABLET | Refills: 0 | Status: SHIPPED | OUTPATIENT
Start: 2022-09-20 | End: 2022-09-30

## 2022-09-20 RX ORDER — LIDOCAINE HYDROCHLORIDE 10 MG/ML
20 INJECTION, SOLUTION EPIDURAL; INFILTRATION; INTRACAUDAL; PERINEURAL ONCE
Status: COMPLETED | OUTPATIENT
Start: 2022-09-20 | End: 2022-09-20

## 2022-09-20 RX ORDER — ACETAMINOPHEN 650 MG
TABLET, EXTENDED RELEASE ORAL
Status: COMPLETED
Start: 2022-09-20 | End: 2022-09-20

## 2022-09-20 RX ORDER — AMOXICILLIN AND CLAVULANATE POTASSIUM 875; 125 MG/1; MG/1
1 TABLET, FILM COATED ORAL ONCE
Status: COMPLETED | OUTPATIENT
Start: 2022-09-20 | End: 2022-09-20

## 2022-09-20 RX ORDER — TETANUS AND DIPHTHERIA TOXOIDS ADSORBED 2; 2 [LF]/.5ML; [LF]/.5ML
0.5 INJECTION INTRAMUSCULAR ONCE
Status: COMPLETED | OUTPATIENT
Start: 2022-09-20 | End: 2022-09-20

## 2022-09-20 RX ORDER — ACETAMINOPHEN 650 MG
TABLET, EXTENDED RELEASE ORAL ONCE
Status: COMPLETED | OUTPATIENT
Start: 2022-09-20 | End: 2022-09-20

## 2022-09-20 RX ORDER — HYDROXYZINE PAMOATE 25 MG/1
25 CAPSULE ORAL 3 TIMES DAILY PRN
Qty: 28 CAPSULE | Refills: 0 | Status: SHIPPED | OUTPATIENT
Start: 2022-09-20 | End: 2022-10-04

## 2022-09-20 RX ORDER — HYDROXYZINE PAMOATE 25 MG/1
25 CAPSULE ORAL ONCE
Status: COMPLETED | OUTPATIENT
Start: 2022-09-20 | End: 2022-09-20

## 2022-09-20 RX ORDER — HYDROCODONE BITARTRATE AND ACETAMINOPHEN 5; 325 MG/1; MG/1
1 TABLET ORAL EVERY 8 HOURS PRN
Qty: 9 TABLET | Refills: 0 | Status: SHIPPED | OUTPATIENT
Start: 2022-09-20 | End: 2022-09-23

## 2022-09-20 RX ORDER — GINSENG 100 MG
CAPSULE ORAL ONCE
Status: DISCONTINUED | OUTPATIENT
Start: 2022-09-20 | End: 2022-09-20 | Stop reason: HOSPADM

## 2022-09-20 RX ADMIN — TETANUS AND DIPHTHERIA TOXOIDS ADSORBED 0.5 ML: 2; 2 INJECTION INTRAMUSCULAR at 15:54

## 2022-09-20 RX ADMIN — Medication: at 16:42

## 2022-09-20 RX ADMIN — HYDROXYZINE PAMOATE 25 MG: 25 CAPSULE ORAL at 17:48

## 2022-09-20 RX ADMIN — HYDROCODONE BITARTRATE AND ACETAMINOPHEN 1 TABLET: 5; 325 TABLET ORAL at 16:40

## 2022-09-20 RX ADMIN — AMOXICILLIN AND CLAVULANATE POTASSIUM 1 TABLET: 875; 125 TABLET, FILM COATED ORAL at 17:48

## 2022-09-20 RX ADMIN — LORAZEPAM 0.5 MG: 0.5 TABLET ORAL at 15:54

## 2022-09-20 RX ADMIN — LIDOCAINE HYDROCHLORIDE 20 ML: 10 INJECTION, SOLUTION EPIDURAL; INFILTRATION; INTRACAUDAL; PERINEURAL at 16:42

## 2022-09-20 ASSESSMENT — PAIN SCALES - GENERAL
PAINLEVEL_OUTOF10: 10
PAINLEVEL_OUTOF10: 7

## 2022-09-20 ASSESSMENT — PAIN DESCRIPTION - LOCATION: LOCATION: LEG;BUTTOCKS

## 2022-09-20 ASSESSMENT — ENCOUNTER SYMPTOMS
EYE PAIN: 0
ABDOMINAL DISTENTION: 0
EYE REDNESS: 0
NAUSEA: 0
COUGH: 0
WHEEZING: 0
DIARRHEA: 0
EYE DISCHARGE: 0
SORE THROAT: 0
SINUS PRESSURE: 0
BACK PAIN: 0
SHORTNESS OF BREATH: 0
VOMITING: 0

## 2022-09-20 ASSESSMENT — PAIN DESCRIPTION - ORIENTATION: ORIENTATION: LEFT;RIGHT

## 2022-09-20 ASSESSMENT — PAIN - FUNCTIONAL ASSESSMENT: PAIN_FUNCTIONAL_ASSESSMENT: 0-10

## 2022-09-20 NOTE — ED PROVIDER NOTES
Department of Emergency Medicine   ED  Provider Note  Admit Date/RoomTime: 9/20/2022  3:07 PM  ED Room: 22/22              Mandeep Feliz is a 63 y.o. female with a PMHx significant for HTN, HLD who presents for evaluation of animal bites, beginning prior to evaluation. The complaint has been persistent, moderate in severity, and worsened by nothing. The patient states that she was walking in her neighborhood when she was attacked by a Rottweiler. States that it bit her legs, then went [place] around and bite her again. She attempted to get away, try to jump up on a trash can when it bit her upper leg. Finally someone close by came by and she was able to get away. Is unsure of her last tetanus shot. They state that they were able to find the owner of the dog and it is up-to-date on its rabies vaccinations. Denies any LOC, hitting her head. The history is provided by the patient and medical records. Review of Systems   Constitutional:  Negative for chills and fever. HENT:  Negative for ear pain, sinus pressure and sore throat. Eyes:  Negative for pain, discharge and redness. Respiratory:  Negative for cough, shortness of breath and wheezing. Cardiovascular:  Negative for chest pain. Gastrointestinal:  Negative for abdominal distention, diarrhea, nausea and vomiting. Genitourinary:  Negative for dysuria and frequency. Musculoskeletal:  Negative for arthralgias and back pain. Skin:  Positive for wound. Negative for rash. Neurological:  Negative for weakness and headaches. Hematological:  Negative for adenopathy. All other systems reviewed and are negative. Physical Exam  Vitals and nursing note reviewed. Constitutional:       General: She is in acute distress (very anxious). Appearance: Normal appearance. She is well-developed. She is not ill-appearing. HENT:      Head: Normocephalic and atraumatic.       Right Ear: External ear normal.      Left Ear: External ear normal.   Eyes:      General:         Right eye: No discharge. Left eye: No discharge. Extraocular Movements: Extraocular movements intact. Conjunctiva/sclera: Conjunctivae normal.   Cardiovascular:      Rate and Rhythm: Normal rate and regular rhythm. Heart sounds: Normal heart sounds. No murmur heard. Pulmonary:      Effort: Pulmonary effort is normal. No respiratory distress. Breath sounds: Normal breath sounds. No stridor. Abdominal:      General: There is no distension. Palpations: Abdomen is soft. There is no mass. Musculoskeletal:         General: No swelling or tenderness. Cervical back: Normal range of motion and neck supple. Skin:     General: Skin is warm and dry. Coloration: Skin is not jaundiced or pale. Findings: Lesion present. Comments: Multiple wounds noted to bilateral lower extremities  Puncture wounds to the RLE  2 1.5 cm wounds to the LLE and one puncture wound  1 puncture wound noted bellow the left buttock   Neurological:      General: No focal deficit present. Mental Status: She is alert. Procedures    Laceration Repair Procedure Note    Indication: Laceration  Procedure: The patient was placed in the appropriate position and anesthesia around the laceration was obtained by infiltration using 1% Lidocaine without epinephrine. The area was then irrigated with betadine mixed with sterile water. The laceration was closed with 4-0 Ethilon using interrupted sutures. A second laceration was closed with 4-0 Ethilon using interrupted sutures. The wound area was then dressed with bacitracin. Minimal debridement was preformed, flaps were aligned. No foreign body was identified. A third laceration was closed with 4-0 Ethilon using interrupted sutures. The first laceration had 5 superficial and 1 deep suture.   Second laceration behind the knee had 4 sutures  Third laceration had 1 suture behind the right knee  Total repaired wound length: 6 cm. Other Items: Suture count: 11 (1 deep vicryl)  The patient tolerated the procedure well. Complications: None      MDM           Ebony Common Tray presents to the ED for evaluation of animal bite. Patient was bit by animal that is not known to her, but was found to be up to date on rabies vaccinations. Workup in the ED revealed Patient with multiple abrasions / lacerations from dog. Tetanus was updated. Some of the wounds did require suturing due to the size. They were loosely approximated. Patient was very anxious due to the recent attack, she was given oral ativan and vistaril. She was given first dose of augmentin in the department with RX of Augmentin, Vistaril, and norco to go home with. Patient continues to be non-toxic on re-evaluation. Findings were discussed with the patient and reasons to immediately return to the ED were articulated to them. They will follow-up with their PCP. Due to the location of the multiple bites, she was also given orthopedic surgery to follow up with.    --------------------------------------------- PAST HISTORY ---------------------------------------------  Past Medical History:  has a past medical history of Hyperlipidemia and Screening for colon cancer. Past Surgical History:  has a past surgical history that includes Tonsillectomy; hernia repair; Cervical discectomy (02/06/2017); Colonoscopy; and Colonoscopy (N/A, 3/9/2022). Social History:  reports that she has never smoked. She has never used smokeless tobacco. She reports current alcohol use. She reports that she does not use drugs. Family History: family history includes Heart Attack in her brother; Other in her brother. The patients home medications have been reviewed. Allergies: Patient has no known allergies.     -------------------------------------------------- RESULTS -------------------------------------------------  Labs:  No results found for this visit on 09/20/22. Radiology:  XR TIBIA FIBULA LEFT (2 VIEWS)   Final Result   No acute osseous abnormality. Suspect soft tissue injury, lateral aspect. XR TIBIA FIBULA RIGHT (2 VIEWS)   Final Result   No acute osseous abnormality. Soft tissue injury in the posteromedial aspect of the knee. XR HIP 2-3 VW W PELVIS LEFT   Final Result   No acute abnormality of the pelvis and left hip.             ------------------------- NURSING NOTES AND VITALS REVIEWED ---------------------------  Date / Time Roomed:  9/20/2022  3:07 PM  ED Bed Assignment:  22/22    The nursing notes within the ED encounter and vital signs as below have been reviewed. BP (!) 133/91   Pulse 99   Temp 97.9 °F (36.6 °C)   Resp 20   LMP 10/06/2014   SpO2 100%   Oxygen Saturation Interpretation: Normal      ------------------------------------------ PROGRESS NOTES ------------------------------------------  8:28 PM EDT  I have spoken with the patient and discussed todays results, in addition to providing specific details for the plan of care and counseling regarding the diagnosis and prognosis. Their questions are answered at this time and they are agreeable with the plan. I discussed at length with them reasons for immediate return here for re evaluation. They will followup with their primary care physician by calling their office tomorrow. --------------------------------- ADDITIONAL PROVIDER NOTES ---------------------------------  At this time the patient is without objective evidence of an acute process requiring hospitalization or inpatient management. They have remained hemodynamically stable throughout their entire ED visit and are stable for discharge with outpatient follow-up. The plan has been discussed in detail and they are aware of the specific conditions for emergent return, as well as the importance of follow-up.       Discharge Medication List as of 9/20/2022  6:26 PM        START taking these medications    Details   amoxicillin-clavulanate (AUGMENTIN) 875-125 MG per tablet Take 1 tablet by mouth 2 times daily for 10 days, Disp-20 tablet, R-0Print      hydrOXYzine pamoate (VISTARIL) 25 MG capsule Take 1 capsule by mouth 3 times daily as needed for Anxiety, Disp-28 capsule, R-0Print             Diagnosis:  1. Dog bite, initial encounter        Disposition:  Patient's disposition: Discharge to home  Patient's condition is stable.          Lilia Pearson DO  09/25/22 2031

## 2022-09-21 ENCOUNTER — OFFICE VISIT (OUTPATIENT)
Dept: PRIMARY CARE CLINIC | Age: 62
End: 2022-09-21
Payer: COMMERCIAL

## 2022-09-21 DIAGNOSIS — S81.811D LACERATION OF RIGHT LOWER EXTREMITY, SUBSEQUENT ENCOUNTER: ICD-10-CM

## 2022-09-21 DIAGNOSIS — T07.XXXA MULTIPLE PUNCTURE WOUNDS: ICD-10-CM
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
## 2022-09-21 DIAGNOSIS — S81.812D LACERATION OF LEFT LOWER EXTREMITY, SUBSEQUENT ENCOUNTER: ICD-10-CM
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
## 2022-09-21 DIAGNOSIS — S31.825D: ICD-10-CM
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
## 2022-09-21 DIAGNOSIS — T14.8XXA TRAUMATIC ECCHYMOSIS OF MULTIPLE SITES: ICD-10-CM

## 2022-09-21 DIAGNOSIS — S81.851D DOG BITE OF RIGHT LOWER LEG, SUBSEQUENT ENCOUNTER: Primary | ICD-10-CM

## 2022-09-21 DIAGNOSIS — S31.821D: ICD-10-CM

## 2022-09-21 DIAGNOSIS — S81.852D DOG BITE OF LEFT LOWER LEG, SUBSEQUENT ENCOUNTER: ICD-10-CM

## 2022-09-21 DIAGNOSIS — S31.815D: ICD-10-CM

## 2022-09-21 PROCEDURE — 1036F TOBACCO NON-USER: CPT | Performed by: FAMILY MEDICINE

## 2022-09-21 PROCEDURE — G8428 CUR MEDS NOT DOCUMENT: HCPCS | Performed by: FAMILY MEDICINE

## 2022-09-21 PROCEDURE — 3017F COLORECTAL CA SCREEN DOC REV: CPT | Performed by: FAMILY MEDICINE

## 2022-09-21 PROCEDURE — 99215 OFFICE O/P EST HI 40 MIN: CPT | Performed by: FAMILY MEDICINE

## 2022-09-21 PROCEDURE — G8419 CALC BMI OUT NRM PARAM NOF/U: HCPCS | Performed by: FAMILY MEDICINE

## 2022-09-21 NOTE — PROGRESS NOTES
22  Name: Keith Galeazzi Deascentis    : 1960    Sex: female    Age: 63 y.o. Subjective:  Chief Complaint: Patient is here for mul dog bites. Patient presents accompanied by her . She is walking her neighborhood in the neighborhood do not suddenly attacked her and bit her several places. Neighbor came to help her. She was taken emergency room. She had 5 sutures in her left leg 1 in her right leg Steri-Strips in her left buttocks. Had x-rays done of the tibia-fibula bilateral pelvis and left hip which were all negative for fracture. She is in significant pain today. She was started on Augmentin and given a tetanus shot. The dog apparently is up-to-date on shots in the world and is involved. Review of Systems   Constitutional: Negative. HENT: Negative. Eyes: Negative. Respiratory: Negative. Cardiovascular: Negative. Gastrointestinal: Negative. Endocrine: Negative. Genitourinary: Negative. Musculoskeletal: Negative. Skin:         See HPI   Allergic/Immunologic: Negative. Neurological: Negative. Hematological: Negative. Psychiatric/Behavioral: Negative. Current Outpatient Medications:     mupirocin (BACTROBAN) 2 % ointment, Apply topically 3 times daily. , Disp: 120 g, Rfl: 5    amoxicillin-clavulanate (AUGMENTIN) 875-125 MG per tablet, Take 1 tablet by mouth 2 times daily for 10 days, Disp: 20 tablet, Rfl: 0    hydrOXYzine pamoate (VISTARIL) 25 MG capsule, Take 1 capsule by mouth 3 times daily as needed for Anxiety, Disp: 28 capsule, Rfl: 0    HYDROcodone-acetaminophen (NORCO) 5-325 MG per tablet, Take 1 tablet by mouth every 8 hours as needed for Pain for up to 3 days. Intended supply: 3 days.  Take lowest dose possible to manage pain, Disp: 9 tablet, Rfl: 0    Red Yeast Rice Extract (RED YEAST RICE PO), Take by mouth, Disp: , Rfl:     Omega-3 Fatty Acids (FISH OIL OMEGA-3 PO), Take 1 capsule by mouth, Disp: , Rfl:     vitamin D (CHOLECALCIFEROL) 1000 UNITS TABS tablet, Take 1,000 Units by mouth daily, Disp: , Rfl:   No Known Allergies  Social History     Socioeconomic History    Marital status:      Spouse name: Not on file    Number of children: Not on file    Years of education: Not on file    Highest education level: Not on file   Occupational History    Not on file   Tobacco Use    Smoking status: Never    Smokeless tobacco: Never   Vaping Use    Vaping Use: Never used   Substance and Sexual Activity    Alcohol use: Yes     Comment: socially    Drug use: No    Sexual activity: Not on file   Other Topics Concern    Not on file   Social History Narrative    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   is a  at [employer]- boy 29 girl 34 girl 20---------------------------one nabeel moved to [place]      10-21    Father  from CHF and Parkinson's disease. Family history of CAD and a brother who had 2 myocardial infarction as before 61    sis.  Breana Larger    Hyperlipidemia------------------------------------------start statin      Elevated blood pressure --in office    great at home     white coat HTN    Chronic low back pain with lumbar disc into the right leg---mri     With  djd  And   Lesion with neg bone scan at [facility]    Cervical disc surgery 2016 with     Uterine ablation    Myalgias    Elev Chol with high  hdl  7-20  due 6 mo and failed    Mom      at  80 yrs old    Colon   age 52--due ten yrs    CTA heart    with some plaque    Daughter moved to [place]    Son  recovery etoh    broke up with gf         Social Determinants of Health     Financial Resource Strain: Not on file   Food Insecurity: Not on file   Transportation Needs: Not on file   Physical Activity: Not on file   Stress: Not on file   Social Connections: Not on file   Intimate Partner Violence: Not on file   Housing Stability: Not on file      Past Medical History:   Diagnosis Date    Hyperlipidemia     diet only    Screening for colon cancer     For colonoscopy 3-9-22     Family History   Problem Relation Age of Onset    Other Brother     Heart Attack Brother       Past Surgical History:   Procedure Laterality Date    CERVICAL DISCECTOMY  02/06/2017    anterior cervical discectomy and fusion C5-C6 with Dr. Jane Wilson N/A 3/9/2022    COLORECTAL CANCER SCREENING, NOT HIGH RISK performed by Josefa Woods MD at   [facility] St:    09/21/22 1614   BP: (!) 145/82   Temp: 99.3 °F (37.4 °C)   TempSrc: Oral   Weight: Comment: declined       Objective:    Physical Exam  Vitals reviewed. Constitutional:       Appearance: Normal appearance. She is well-developed. HENT:      Head: Normocephalic. Right Ear: Tympanic membrane normal.      Left Ear: Tympanic membrane normal.      Nose: Nose normal.      Mouth/Throat:      Mouth: Mucous membranes are moist.   Eyes:      Pupils: Pupils are equal, round, and reactive to light. Cardiovascular:      Rate and Rhythm: Normal rate and regular rhythm. Pulmonary:      Effort: Pulmonary effort is normal.      Breath sounds: Normal breath sounds. Abdominal:      General: Bowel sounds are normal.      Palpations: Abdomen is soft. Musculoskeletal:         General: Normal range of motion. Cervical back: Normal range of motion. Skin:     Comments: Multiple ecchymotic areas throughout. She has 2 areas on bilateral hips and buttocks with significant ecchymosis. Superficial abrasions and puncture wounds. Her left buttock is a superficial laceration with 2 Steri-Strips. Left lower leg posterior mid calf area has multiple puncture wounds and 1 laceration with 5 sutures in place with no erythema or discharge.       Right lower leg just below the knee with laceration with 1 suture with multiple puncture wounds. No erythema or discharge. Significant ecchymosis to both upper and mid calf posteriorly   Neurological:      Mental Status: She is alert and oriented to person, place, and time. Psychiatric:         Behavior: Behavior normal.       Jared Woods was seen today for follow-up. Diagnoses and all orders for this visit:    Dog bite of right lower leg, subsequent encounter    Dog bite of left lower leg, subsequent encounter    Laceration of left lower extremity, subsequent encounter    Laceration of right lower extremity, subsequent encounter    Multiple puncture wounds    Dog bite of buttock, left, subsequent encounter    Dog bite of buttock, right, subsequent encounter    Laceration of left buttock, subsequent encounter    Traumatic ecchymosis of multiple sites    Other orders  -     mupirocin (BACTROBAN) 2 % ointment; Apply topically 3 times daily. Comments: Deal of time spent removing multiple dressings on both lower legs and bilateral buttocks. Continue antibiotic. Educated on dressings. Maintain same dry-cleaning and see me back in 72 hours. If any chills or temperature symptoms worse go to ER. Continue pain meds. Call if she needs refill. She is in touch with the dog Tania Bradley and he is assuring that the dog had all shots. Wayne Sees deal time spent removing and applying new dressings. I educated the patient about all medications. Make sure they were correct and educated  on the risk associated with missing meds or taking them incorrectly. A list of medications is being sent home with patient today. I informed patient about the risk associated with noncompliance of medication and taking medications incorrectly. Appropriate follow-up with myself and all specialist.  Encourage family members to take active role in assisting with medications and medical care.   If any confusion should develop to notify my office immediately to avoid risk of worsening medical condition      A great deal of time spent reviewing medications, diet, exercise, social issues. Also reviewing the chart before entering the room with patient and finishing charting after leaving patient's room. More than half of that time was spent face to face with the patient in counseling and coordinating care. Follow Up: Return for saturday morning.      Seen by:  Adam Richardson DO

## 2022-09-22 VITALS — DIASTOLIC BLOOD PRESSURE: 82 MMHG | SYSTOLIC BLOOD PRESSURE: 145 MMHG | TEMPERATURE: 99.3 F
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
## 2022-09-22 PROBLEM — S81.812A LACERATION OF LEFT LOWER EXTREMITY: Status: ACTIVE | Noted: 2022-09-22

## 2022-09-22 PROBLEM — S31.815D: Status: ACTIVE | Noted: 2022-09-22

## 2022-09-22 PROBLEM — T07.XXXA MULTIPLE PUNCTURE WOUNDS: Status: ACTIVE | Noted: 2022-09-22

## 2022-09-22 PROBLEM — S81.852A DOG BITE OF LEFT LOWER LEG: Status: ACTIVE | Noted: 2022-09-22
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
## 2022-09-22 PROBLEM — S81.851A DOG BITE OF RIGHT LOWER LEG: Status: ACTIVE | Noted: 2022-09-22

## 2022-09-22 PROBLEM — S31.825D: Status: ACTIVE | Noted: 2022-09-22

## 2022-09-22 PROBLEM — S31.821A: Status: ACTIVE | Noted: 2022-09-22

## 2022-09-22 PROBLEM — S81.811A LACERATION OF RIGHT LOWER EXTREMITY: Status: ACTIVE | Noted: 2022-09-22

## 2022-09-22 PROBLEM — T14.8XXA TRAUMATIC ECCHYMOSIS OF MULTIPLE SITES: Status: ACTIVE | Noted: 2022-09-22

## 2022-09-22 ASSESSMENT — PATIENT HEALTH QUESTIONNAIRE - PHQ9
1. LITTLE INTEREST OR PLEASURE IN DOING THINGS: 0
SUM OF ALL RESPONSES TO PHQ QUESTIONS 1-9: 0
SUM OF ALL RESPONSES TO PHQ9 QUESTIONS 1 & 2: 0
SUM OF ALL RESPONSES TO PHQ QUESTIONS 1-9: 0
2. FEELING DOWN, DEPRESSED OR HOPELESS: 0
SUM OF ALL RESPONSES TO PHQ QUESTIONS 1-9: 0
SUM OF ALL RESPONSES TO PHQ QUESTIONS 1-9: 0

## 2022-09-22 ASSESSMENT — ENCOUNTER SYMPTOMS
EYES NEGATIVE: 1
ALLERGIC/IMMUNOLOGIC NEGATIVE: 1
RESPIRATORY NEGATIVE: 1
GASTROINTESTINAL NEGATIVE: 1
ROS SKIN COMMENTS: SEE HPI

## 2022-09-24 ENCOUNTER — OFFICE VISIT (OUTPATIENT)
Dept: PRIMARY CARE CLINIC | Age: 62
End: 2022-09-24
Payer: COMMERCIAL

## 2022-09-24 VITALS — WEIGHT: 160.8 LBS | TEMPERATURE: 97.7 F | HEIGHT: 65 IN | BODY MASS INDEX: 26.79 KG/M2

## 2022-09-24 DIAGNOSIS — S81.811D LACERATION OF RIGHT LOWER EXTREMITY, SUBSEQUENT ENCOUNTER: ICD-10-CM
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
## 2022-09-24 DIAGNOSIS — S81.852D DOG BITE OF LEFT LOWER LEG, SUBSEQUENT ENCOUNTER: ICD-10-CM

## 2022-09-24 DIAGNOSIS — S31.815D: Primary | ICD-10-CM
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
## 2022-09-24 DIAGNOSIS — T07.XXXA MULTIPLE PUNCTURE WOUNDS: ICD-10-CM

## 2022-09-24 DIAGNOSIS — S31.821D: ICD-10-CM

## 2022-09-24 DIAGNOSIS — T14.8XXA TRAUMATIC ECCHYMOSIS OF MULTIPLE SITES: ICD-10-CM

## 2022-09-24 PROCEDURE — G8419 CALC BMI OUT NRM PARAM NOF/U: HCPCS | Performed by: FAMILY MEDICINE

## 2022-09-24 PROCEDURE — 1036F TOBACCO NON-USER: CPT | Performed by: FAMILY MEDICINE

## 2022-09-24 PROCEDURE — 3017F COLORECTAL CA SCREEN DOC REV: CPT | Performed by: FAMILY MEDICINE

## 2022-09-24 PROCEDURE — 99214 OFFICE O/P EST MOD 30 MIN: CPT | Performed by: FAMILY MEDICINE

## 2022-09-24 PROCEDURE — G8428 CUR MEDS NOT DOCUMENT: HCPCS | Performed by: FAMILY MEDICINE

## 2022-09-24 ASSESSMENT — ENCOUNTER SYMPTOMS
EYES NEGATIVE: 1
ALLERGIC/IMMUNOLOGIC NEGATIVE: 1
ROS SKIN COMMENTS: HPI
RESPIRATORY NEGATIVE: 1
GASTROINTESTINAL NEGATIVE: 1

## 2022-09-24 NOTE — PROGRESS NOTES
22  Name: Sandra Feliz    : 1960    Sex: female    Age: 63 y.o. Subjective:  Chief Complaint: Patient is here for  re ck  bilat  lower  extrem dog bites     Accompanied by her . Has not driven. Still some pain. Taking the antibiotics. Has a same dressing reapplied. Easily removed. No chills or temperature. Review of Systems   Constitutional: Negative. HENT: Negative. Eyes: Negative. Respiratory: Negative. Cardiovascular: Negative. Gastrointestinal: Negative. Endocrine: Negative. Genitourinary: Negative. Musculoskeletal: Negative. Skin:         hpi   Allergic/Immunologic: Negative. Neurological: Negative. Hematological: Negative. Psychiatric/Behavioral: Negative. Current Outpatient Medications:     mupirocin (BACTROBAN) 2 % ointment, Apply topically 3 times daily. , Disp: 120 g, Rfl: 5    amoxicillin-clavulanate (AUGMENTIN) 875-125 MG per tablet, Take 1 tablet by mouth 2 times daily for 10 days, Disp: 20 tablet, Rfl: 0    hydrOXYzine pamoate (VISTARIL) 25 MG capsule, Take 1 capsule by mouth 3 times daily as needed for Anxiety, Disp: 28 capsule, Rfl: 0    Red Yeast Rice Extract (RED YEAST RICE PO), Take by mouth, Disp: , Rfl:     Omega-3 Fatty Acids (FISH OIL OMEGA-3 PO), Take 1 capsule by mouth, Disp: , Rfl:     vitamin D (CHOLECALCIFEROL) 1000 UNITS TABS tablet, Take 1,000 Units by mouth daily, Disp: , Rfl:   No Known Allergies  Social History     Socioeconomic History    Marital status:      Spouse name: Not on file    Number of children: Not on file    Years of education: Not on file    Highest education level: Not on file   Occupational History    Not on file   Tobacco Use    Smoking status: Never    Smokeless tobacco: Never   Vaping Use    Vaping Use: Never used   Substance and Sexual Activity    Alcohol use: Yes     Comment: socially    Drug use: No    Sexual activity: Not on file   Other Topics Concern    Not on file   Social History Narrative    [employer]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   is a  at [employer]- boy 29 girl 34 girl 20---------------------------one nabeel moved to [place] [employer] [place]      10-21    Father  from CHF and Parkinson's disease. Family history of CAD and a brother who had 2 myocardial infarction as before 61    sis.  Fitz Mamta    Hyperlipidemia------------------------------------------start statin      Elevated blood pressure --in office    great at home     white coat HTN    Chronic low back pain with lumbar disc into the right leg---mri     With  djd  And   Lesion with neg bone scan at [facility]    Cervical disc surgery  with     Uterine ablation    Myalgias    Elev Chol with high  hdl  7-20  due 6 mo and failed    Mom      at  80 yrs old    Colon   age 52--due ten yrs    CTA heart    with some plaque    Daughter moved to [place]    Son  recovery etoh    broke up with gf         Social Determinants of Health     Financial Resource Strain: Not on file   Food Insecurity: Not on file   Transportation Needs: Not on file   Physical Activity: Not on file   Stress: Not on file   Social Connections: Not on file   Intimate Partner Violence: Not on file   Housing Stability: Not on file      Past Medical History:   Diagnosis Date    Hyperlipidemia     diet only    Screening for colon cancer     For colonoscopy 3-9-22     Family History   Problem Relation Age of Onset    Other Brother     Heart Attack Brother       Past Surgical History:   Procedure Laterality Date    CERVICAL DISCECTOMY  2017    anterior cervical discectomy and fusion C5-C6 with Dr. Nirav Parekh N/A 3/9/2022    COLORECTAL CANCER SCREENING, NOT HIGH RISK performed by Marika Muro MD at [address] [place] [address] St:    22 0715 dressings and reapplying bacitracin and nonstick dressing with Telfa and gauze. Educated on how to change twice a day see me back on Friday for suture removal.  Call if any chills or temperature. I educated the patient about all medications. Make sure they were correct and educated  on the risk associated with missing meds or taking them incorrectly. A list of medications is being sent home with patient today. ,.I informed patient about the risk associated with noncompliance of medication and taking medications incorrectly. Appropriate follow-up with myself and all specialist.  Encourage family members to take active role in assisting with medications and medical care. If any confusion should develop to notify my office immediately to avoid risk of worsening medical condition    .t  A great deal of time spent reviewing medications, diet, exercise, social issues. Also reviewing the chart before entering the room with patient and finishing charting after leaving patient's room. More than half of that time was spent face to face with the patient in counseling and coordinating care.       Follow Up: Return in about 6 days (around 9/30/2022) for for suture removal.     Seen by:  Paul Lemus DO

## 2022-09-27 ENCOUNTER — OFFICE VISIT (OUTPATIENT)
Dept: PRIMARY CARE CLINIC | Age: 62
End: 2022-09-27
Payer: COMMERCIAL

## 2022-09-27 VITALS — TEMPERATURE: 98.6 F | SYSTOLIC BLOOD PRESSURE: 135 MMHG | DIASTOLIC BLOOD PRESSURE: 78 MMHG

## 2022-09-27 DIAGNOSIS — F43.10 PTSD (POST-TRAUMATIC STRESS DISORDER): ICD-10-CM

## 2022-09-27 DIAGNOSIS — T07.XXXA MULTIPLE PUNCTURE WOUNDS: Primary | ICD-10-CM

## 2022-09-27 DIAGNOSIS — F41.9 ANXIETY: ICD-10-CM

## 2022-09-27 DIAGNOSIS — T14.8XXA TRAUMATIC ECCHYMOSIS OF MULTIPLE SITES: ICD-10-CM

## 2022-09-27 DIAGNOSIS — T07.XXXA MULTIPLE PUNCTURE WOUNDS: ICD-10-CM

## 2022-09-27 DIAGNOSIS — S81.811D LACERATION OF RIGHT LOWER EXTREMITY, SUBSEQUENT ENCOUNTER: ICD-10-CM

## 2022-09-27 DIAGNOSIS — S31.821D: ICD-10-CM

## 2022-09-27 PROCEDURE — 3017F COLORECTAL CA SCREEN DOC REV: CPT | Performed by: FAMILY MEDICINE

## 2022-09-27 PROCEDURE — G8428 CUR MEDS NOT DOCUMENT: HCPCS | Performed by: FAMILY MEDICINE

## 2022-09-27 PROCEDURE — G8419 CALC BMI OUT NRM PARAM NOF/U: HCPCS | Performed by: FAMILY MEDICINE

## 2022-09-27 PROCEDURE — 99214 OFFICE O/P EST MOD 30 MIN: CPT | Performed by: FAMILY MEDICINE

## 2022-09-27 PROCEDURE — 1036F TOBACCO NON-USER: CPT | Performed by: FAMILY MEDICINE

## 2022-09-27 ASSESSMENT — ENCOUNTER SYMPTOMS
EYES NEGATIVE: 1
ALLERGIC/IMMUNOLOGIC NEGATIVE: 1
ROS SKIN COMMENTS: SEE   HPI
GASTROINTESTINAL NEGATIVE: 1
RESPIRATORY NEGATIVE: 1

## 2022-09-27 NOTE — PROGRESS NOTES
22  Name: Claudia Feliz    : 1960    Sex: female    Age: 63 y.o. Subjective:  Chief Complaint: Patient is here for  ck   wounds     Pt asked for earlier ov     to ck left psotr  calf       no  dc  left  lg sl swelling yest  she elev leg and much better   her ewith hus  unbale to works  sits all day       Review of Systems   Constitutional: Negative. HENT: Negative. Eyes: Negative. Respiratory: Negative. Cardiovascular: Negative. Gastrointestinal: Negative. Endocrine: Negative. Genitourinary: Negative. Musculoskeletal: Negative. Skin:         See   hpi   Allergic/Immunologic: Negative. Neurological: Negative. Hematological: Negative. Psychiatric/Behavioral: Negative. Current Outpatient Medications:     mupirocin (BACTROBAN) 2 % ointment, Apply topically 3 times daily. , Disp: 120 g, Rfl: 5    amoxicillin-clavulanate (AUGMENTIN) 875-125 MG per tablet, Take 1 tablet by mouth 2 times daily for 10 days, Disp: 20 tablet, Rfl: 0    hydrOXYzine pamoate (VISTARIL) 25 MG capsule, Take 1 capsule by mouth 3 times daily as needed for Anxiety, Disp: 28 capsule, Rfl: 0    Red Yeast Rice Extract (RED YEAST RICE PO), Take by mouth, Disp: , Rfl:     Omega-3 Fatty Acids (FISH OIL OMEGA-3 PO), Take 1 capsule by mouth, Disp: , Rfl:     vitamin D (CHOLECALCIFEROL) 1000 UNITS TABS tablet, Take 1,000 Units by mouth daily, Disp: , Rfl:   No Known Allergies  Social History     Socioeconomic History    Marital status:      Spouse name: Not on file    Number of children: Not on file    Years of education: Not on file    Highest education level: Not on file   Occupational History    Not on file   Tobacco Use    Smoking status: Never    Smokeless tobacco: Never   Vaping Use    Vaping Use: Never used   Substance and Sexual Activity    Alcohol use: Yes     Comment: socially    Drug use: No    Sexual activity: Not on file   Other Topics Concern    Not on file   Social History Narrative    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   is a  at [employer]- boy 29 girl 34 girl 20---------------------------one nabeel moved to [place]      10-21    Father  from CHF and Parkinson's disease. Family history of CAD and a brother who had 2 myocardial infarction as before 61    sis.  Se Woodson    Hyperlipidemia------------------------------------------start statin      Elevated blood pressure --in office    great at home     white coat HTN    Chronic low back pain with lumbar disc into the right leg---mri     With  djd  And   Lesion with neg bone scan at [facility]    Cervical disc surgery  with     Uterine ablation    Myalgias    Elev Chol with high  hdl  7-20  due 6 mo and failed    Mom      at  80 yrs old    Colon   age 52--due ten yrs    CTA heart    with some plaque    Daughter moved to [place]    Son  recovery etoh    broke up with gf         Social Determinants of Health     Financial Resource Strain: Not on file   Food Insecurity: Not on file   Transportation Needs: Not on file   Physical Activity: Not on file   Stress: Not on file   Social Connections: Not on file   Intimate Partner Violence: Not on file   Housing Stability: Not on file      Past Medical History:   Diagnosis Date    Hyperlipidemia     diet only    Screening for colon cancer     For colonoscopy 3-9-22     Family History   Problem Relation Age of Onset    Other Brother     Heart Attack Brother       Past Surgical History:   Procedure Laterality Date    CERVICAL DISCECTOMY  2017    anterior cervical discectomy and fusion C5-C6 with Dr. Ranjith Ortega N/A 3/9/2022    COLORECTAL CANCER SCREENING, NOT HIGH RISK performed by Dandy Alexander MD at   [facility] St:    22 0646   BP: 135/78   Temp: 98.6 °F (37 °C)   TempSrc: Oral       Objective:    Physical Exam  Vitals reviewed. Constitutional:       Appearance: Normal appearance. She is well-developed. Comments: Tearful talking about bite  and afraid to be alone   HENT:      Head: Normocephalic. Right Ear: Tympanic membrane normal.      Left Ear: Tympanic membrane normal.      Nose: Nose normal.      Mouth/Throat:      Mouth: Mucous membranes are moist.   Eyes:      Pupils: Pupils are equal, round, and reactive to light. Cardiovascular:      Rate and Rhythm: Normal rate and regular rhythm. Pulmonary:      Effort: Pulmonary effort is normal.      Breath sounds: Normal breath sounds. Abdominal:      General: Bowel sounds are normal.      Palpations: Abdomen is soft. Musculoskeletal:         General: Normal range of motion. Cervical back: Normal range of motion. Skin:     Comments: All abrasioons scabing well  left  with  suture  with  sl   kp     cx  swab done        Ecchymosis   bilat   prox calf    Neurological:      Mental Status: She is alert and oriented to person, place, and time. Psychiatric:         Behavior: Behavior normal.       [facility] was seen today for other. Diagnoses and all orders for this visit:    Multiple puncture wounds    Traumatic ecchymosis of multiple sites    Laceration of right lower extremity, subsequent encounter    Laceration of left buttock, subsequent encounter    Anxiety    PTSD (post-traumatic stress disorder)      Comments: all  drssing re done     cx sent      she kianna  consdier  counseling  see me Friday  and will plan for   posss  anx med        May  be able to remove  sutures  fri      I educated the patient about all medications. Make sure they were correct and educated  on the risk associated with missing meds or taking them incorrectly. A list of medications is being sent home with patient today. Check blood pressure at home twice a day. Low-salt low caffeine diet.   Call if systolic blood pressure is above 525 and diastolic blood pressures above 85. Only use a upper arm digital cuff. I informed patient about the risk associated with noncompliance of medication and taking medications incorrectly. Appropriate follow-up with myself and all specialist.  Encourage family members to take active role in assisting with medications and medical care. If any confusion should develop to notify my office immediately to avoid risk of worsening medical condition          A great deal of time spent reviewing medications, diet, exercise, social issues. Also reviewing the chart before entering the room with patient and finishing charting after leaving patient's room. More than half of that time was spent face to face with the patient in counseling and coordinating care. Follow Up: Return for Reg Appt.      Seen by:  Zenon Mcgee DO

## 2022-09-30 ENCOUNTER — OFFICE VISIT (OUTPATIENT)
Dept: PRIMARY CARE CLINIC | Age: 62
End: 2022-09-30
Payer: COMMERCIAL

## 2022-09-30 VITALS
OXYGEN SATURATION: 96 % | HEART RATE: 88 BPM | WEIGHT: 160 LBS | BODY MASS INDEX: 26.63 KG/M2 | SYSTOLIC BLOOD PRESSURE: 134 MMHG | TEMPERATURE: 98.1 F | DIASTOLIC BLOOD PRESSURE: 82 MMHG

## 2022-09-30 DIAGNOSIS — S81.811D LACERATION OF RIGHT LOWER EXTREMITY, SUBSEQUENT ENCOUNTER: ICD-10-CM

## 2022-09-30 DIAGNOSIS — S81.852D DOG BITE OF LEFT LOWER LEG, SUBSEQUENT ENCOUNTER: ICD-10-CM

## 2022-09-30 DIAGNOSIS — Z48.02 ENCOUNTER FOR REMOVAL OF SUTURES: ICD-10-CM
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
## 2022-09-30 DIAGNOSIS — T14.8XXA TRAUMATIC ECCHYMOSIS OF MULTIPLE SITES: ICD-10-CM

## 2022-09-30 DIAGNOSIS — T07.XXXA MULTIPLE PUNCTURE WOUNDS: ICD-10-CM

## 2022-09-30 DIAGNOSIS — S31.815D: Primary | ICD-10-CM

## 2022-09-30 LAB — WOUND/ABSCESS: NORMAL

## 2022-09-30 PROCEDURE — 1036F TOBACCO NON-USER: CPT | Performed by: FAMILY MEDICINE

## 2022-09-30 PROCEDURE — G8419 CALC BMI OUT NRM PARAM NOF/U: HCPCS | Performed by: FAMILY MEDICINE

## 2022-09-30 PROCEDURE — 99214 OFFICE O/P EST MOD 30 MIN: CPT | Performed by: FAMILY MEDICINE

## 2022-09-30 PROCEDURE — 3017F COLORECTAL CA SCREEN DOC REV: CPT | Performed by: FAMILY MEDICINE

## 2022-09-30 PROCEDURE — G8427 DOCREV CUR MEDS BY ELIG CLIN: HCPCS | Performed by: FAMILY MEDICINE

## 2022-09-30 RX ORDER — CEPHALEXIN 500 MG/1
500 CAPSULE ORAL 3 TIMES DAILY
Qty: 21 CAPSULE | Refills: 0 | Status: SHIPPED
Start: 2022-09-30 | End: 2022-10-05 | Stop reason: SDUPTHER

## 2022-09-30 ASSESSMENT — ENCOUNTER SYMPTOMS
RESPIRATORY NEGATIVE: 1
GASTROINTESTINAL NEGATIVE: 1
ALLERGIC/IMMUNOLOGIC NEGATIVE: 1
EYES NEGATIVE: 1
ROS SKIN COMMENTS: HPI

## 2022-09-30 NOTE — PROGRESS NOTES
22  Name: Andrew Feliz    : 1960    Sex: female    Age: 63 y.o. Subjective:  Chief Complaint: Patient is here for check regarding multiple lacerations and abrasions, dog bite. Anxiety    No significant pain in these areas. Some discomfort with ambulation. Very slow moving. No chills or temperature. .  Very anxious since the injury and the dog bites. She is anxious about leaving the house and being attacked by another dog. May discuss counseling after we get through these wounds. Review of Systems   Constitutional: Negative. HENT: Negative. Eyes: Negative. Respiratory: Negative. Cardiovascular: Negative. Gastrointestinal: Negative. Endocrine: Negative. Genitourinary: Negative. Musculoskeletal: Negative. Skin:         HPI   Allergic/Immunologic: Negative. Neurological: Negative. Hematological: Negative. Psychiatric/Behavioral:  The patient is nervous/anxious.         Current Outpatient Medications:     cephALEXin (KEFLEX) 500 MG capsule, Take 1 capsule by mouth 3 times daily for 7 days, Disp: 21 capsule, Rfl: 0    amoxicillin-clavulanate (AUGMENTIN) 875-125 MG per tablet, Take 1 tablet by mouth 2 times daily for 10 days, Disp: 20 tablet, Rfl: 0    hydrOXYzine pamoate (VISTARIL) 25 MG capsule, Take 1 capsule by mouth 3 times daily as needed for Anxiety, Disp: 28 capsule, Rfl: 0    Red Yeast Rice Extract (RED YEAST RICE PO), Take by mouth, Disp: , Rfl:     Omega-3 Fatty Acids (FISH OIL OMEGA-3 PO), Take 1 capsule by mouth, Disp: , Rfl:     vitamin D (CHOLECALCIFEROL) 1000 UNITS TABS tablet, Take 1,000 Units by mouth daily, Disp: , Rfl:   No Known Allergies  Social History     Socioeconomic History    Marital status:      Spouse name: Not on file    Number of children: Not on file    Years of education: Not on file    Highest education level: Not on file   Occupational History    Not on file   Tobacco Use    Smoking status: Never Smokeless tobacco: Never   Vaping Use    Vaping Use: Never used   Substance and Sexual Activity    Alcohol use: Yes     Comment: socially    Drug use: No    Sexual activity: Not on file   Other Topics Concern    Not on file   Social History Narrative    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   is a  at [employer]- boy 29 girl 34 girl 20---------------------------one nabeel moved to [place]      10-21    Father  from CHF and Parkinson's disease. Family history of CAD and a brother who had 2 myocardial infarction as before 61    sis.  [facility] Gr[facility]    Hyperlipidemia------------------------------------------start statin      Elevated blood pressure 620--in office    great at home     white coat HTN    Chronic low back pain with lumbar disc into the right leg---mri     With  djd  And   Lesion with neg bone scan at [facility]    Cervical disc surgery 2016 with     Uterine ablation    Myalgias    Elev Chol with high  hdl  7-20  due 6 mo and failed    Mom      at  80 yrs old    Colon   age 52--due ten yrs    CTA heart    with some plaque    Daughter moved to [place]    Son  recovery etoh    broke up with gf         Social Determinants of Health     Financial Resource Strain: Not on file   Food Insecurity: Not on file   Transportation Needs: Not on file   Physical Activity: Not on file   Stress: Not on file   Social Connections: Not on file   Intimate Partner Violence: Not on file   Housing Stability: Not on file      Past Medical History:   Diagnosis Date    Hyperlipidemia     diet only    Screening for colon cancer     For colonoscopy 3-9-22     Family History   Problem Relation Age of Onset    Other Brother     Heart Attack Brother       Past Surgical History:   Procedure Laterality Date    CERVICAL DISCECTOMY  2017    anterior cervical discectomy and fusion C5-C6 with  [place]    COLONOSCOPY      COLONOSCOPY N/A 3/9/2022    COLORECTAL CANCER SCREENING, NOT HIGH RISK performed by Héctor Iglesias MD at   [facility] [address]:    09/30/22 0822   BP: 134/82   Pulse: 88   Temp: 98.1 °F (36.7 °C)   SpO2: 96%   Weight: 160 lb (72.6 kg)       Objective:    Physical Exam  Vitals reviewed. Constitutional:       Appearance: Normal appearance. She is well-developed. HENT:      Head: Normocephalic. Right Ear: Tympanic membrane normal.      Left Ear: Tympanic membrane normal.      Nose: Nose normal.      Mouth/Throat:      Mouth: Mucous membranes are moist.   Eyes:      Pupils: Pupils are equal, round, and reactive to light. Cardiovascular:      Rate and Rhythm: Normal rate and regular rhythm. Pulmonary:      Effort: Pulmonary effort is normal.      Breath sounds: Normal breath sounds. Abdominal:      General: Bowel sounds are normal.      Palpations: Abdomen is soft. Musculoskeletal:         General: Normal range of motion. Cervical back: Normal range of motion. Skin:     General: Skin is warm. Comments: Left posterior proximal calf laceration with only one of the 5 sutures removed because still some light erythema, left posterior knee incision one years moved none left. And on the right posterior upper lower leg with 1 of 3 sutures removed. Buttocks is healing well. Ecchymosis throughout the buttocks bilateral in both lower extremities. Neurological:      Mental Status: She is alert and oriented to person, place, and time.    Psychiatric:         Behavior: Behavior normal.       Naila Robins was seen today for suture / staple removal.    Diagnoses and all orders for this visit:    Dog bite of buttock, right, subsequent encounter    Dog bite of left lower leg, subsequent encounter    Laceration of right lower extremity, subsequent encounter    Traumatic ecchymosis of multiple sites    Multiple puncture wounds    Encounter for removal of sutures    Other orders  -     cephALEXin (KEFLEX) 500 MG capsule; Take 1 capsule by mouth 3 times daily for 7 days      Comments: Read redo antibiotic for another week. Dressing changes twice daily. See back in 5 days. Call if any symptoms well. Symptoms well notify. Call if any chills or temperature Swelling or pain. I educated the patient about all medications. Make sure they were correct and educated  on the risk associated with missing meds or taking them incorrectly. A list of medications is being sent home with patient today. Check blood pressure at home twice a day. Low-salt low caffeine diet. Call if systolic blood pressure is above 094 and diastolic blood pressures above 85. Only use a upper arm digital cuff. I informed patient about the risk associated with noncompliance of medication and taking medications incorrectly. Appropriate follow-up with myself and all specialist.  Encourage family members to take active role in assisting with medications and medical care. If any confusion should develop to notify my office immediately to avoid risk of worsening medical condition    A great deal of time spent reviewing medications, diet, exercise, social issues. Also reviewing the chart before entering the room with patient and finishing charting after leaving patient's room. More than half of that time was spent face to face with the patient in counseling and coordinating care. Follow Up: Return for wednesday for suture out------rtw monday.      Seen by:  Dayan Julien, DO

## 2022-10-05 ENCOUNTER — OFFICE VISIT (OUTPATIENT)
Dept: PRIMARY CARE CLINIC | Age: 62
End: 2022-10-05
Payer: COMMERCIAL

## 2022-10-05 VITALS — SYSTOLIC BLOOD PRESSURE: 136 MMHG | HEART RATE: 92 BPM | DIASTOLIC BLOOD PRESSURE: 82 MMHG | TEMPERATURE: 98.8 F

## 2022-10-05 DIAGNOSIS — F43.10 PTSD (POST-TRAUMATIC STRESS DISORDER): ICD-10-CM

## 2022-10-05 DIAGNOSIS — S81.811D LACERATION OF RIGHT LOWER EXTREMITY, SUBSEQUENT ENCOUNTER: Primary | ICD-10-CM
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
## 2022-10-05 DIAGNOSIS — S31.821D: ICD-10-CM

## 2022-10-05 DIAGNOSIS — T14.8XXA TRAUMATIC ECCHYMOSIS OF MULTIPLE SITES: ICD-10-CM

## 2022-10-05 DIAGNOSIS — F41.9 ANXIETY: ICD-10-CM

## 2022-10-05 DIAGNOSIS — S31.815D: ICD-10-CM

## 2022-10-05 DIAGNOSIS — S81.852D DOG BITE OF LEFT LOWER LEG, SUBSEQUENT ENCOUNTER: ICD-10-CM

## 2022-10-05 PROCEDURE — G8419 CALC BMI OUT NRM PARAM NOF/U: HCPCS | Performed by: FAMILY MEDICINE

## 2022-10-05 PROCEDURE — 1036F TOBACCO NON-USER: CPT | Performed by: FAMILY MEDICINE

## 2022-10-05 PROCEDURE — G8484 FLU IMMUNIZE NO ADMIN: HCPCS | Performed by: FAMILY MEDICINE

## 2022-10-05 PROCEDURE — G8428 CUR MEDS NOT DOCUMENT: HCPCS | Performed by: FAMILY MEDICINE

## 2022-10-05 PROCEDURE — 99214 OFFICE O/P EST MOD 30 MIN: CPT | Performed by: FAMILY MEDICINE

## 2022-10-05 PROCEDURE — 3017F COLORECTAL CA SCREEN DOC REV: CPT | Performed by: FAMILY MEDICINE

## 2022-10-05 RX ORDER — CEPHALEXIN 500 MG/1
500 CAPSULE ORAL 3 TIMES DAILY
Qty: 21 CAPSULE | Refills: 0 | Status: SHIPPED | OUTPATIENT
Start: 2022-10-05 | End: 2022-10-12

## 2022-10-05 ASSESSMENT — ENCOUNTER SYMPTOMS
EYES NEGATIVE: 1
ALLERGIC/IMMUNOLOGIC NEGATIVE: 1
RESPIRATORY NEGATIVE: 1
GASTROINTESTINAL NEGATIVE: 1

## 2022-10-05 NOTE — PROGRESS NOTES
10/5/22  Name: Meg Feliz    : 1960    Sex: female    Age: 63 y.o. Subjective:  Chief Complaint: Patient is here for    re  ck     bog bite wounds     With her  to recheck the wound. Is been 15 days. Is extremely anxious. Crying through most of the visit. Definitely afraid of having the remaining sutures out in fear of recurrent infection. She is definitely afraid of being bit by dog as she is afraid to move while walking the neighborhood at all. She did go back work a few hours will be harder. She is having to elevate her legs also swell. She has pain in both posterior calves in both buttock areas. Review of Systems   Constitutional: Negative. HENT: Negative. Eyes: Negative. Respiratory: Negative. Cardiovascular: Negative. Gastrointestinal: Negative. Endocrine: Negative. Genitourinary: Negative. Musculoskeletal: Negative. Skin:         Left buttocks posterior ecchymosis areas are improving but the one scabbed over area has some firmness beneath and tenderness when she sits and press. Fearful beginning of cellulitis in that area. Her left posterior calf 1 suture was removed from the remaining laceration there is some open area to the center but with no skin approximation but no discharge. Grade 2 ecchymosis throughout both posterior upper calf. The left anterior lower leg as the seeing large laceration 4 remaining sutures removed. There are some erythema no discharge. Mostly approximated except for central edge. Allergic/Immunologic: Negative. Neurological: Negative. Hematological: Negative. Psychiatric/Behavioral: Negative.          Current Outpatient Medications:     cephALEXin (KEFLEX) 500 MG capsule, Take 1 capsule by mouth 3 times daily for 7 days, Disp: 21 capsule, Rfl: 0    Red Yeast Rice Extract (RED YEAST RICE PO), Take by mouth, Disp: , Rfl:     Omega-3 Fatty Acids (FISH OIL OMEGA-3 PO), Take 1 capsule by mouth, Disp: , Rfl:     vitamin D (CHOLECALCIFEROL) 1000 UNITS TABS tablet, Take 1,000 Units by mouth daily, Disp: , Rfl:   No Known Allergies  Social History     Socioeconomic History    Marital status:      Spouse name: Not on file    Number of children: Not on file    Years of education: Not on file    Highest education level: Not on file   Occupational History    Not on file   Tobacco Use    Smoking status: Never    Smokeless tobacco: Never   Vaping Use    Vaping Use: Never used   Substance and Sexual Activity    Alcohol use: Yes     Comment: socially    Drug use: No    Sexual activity: Not on file   Other Topics Concern    Not on file   Social History Narrative    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   is a  at [employer]- boy 29 girl 34 girl 20---------------------------one nabeel moved to [place]      10-21    Father  from CHF and Parkinson's disease. Family history of CAD and a brother who had 2 myocardial infarction as before 61    sis.  [employer]    Hyperlipidemia------------------------------------------start statin      Elevated blood pressure --in office    great at home     white coat HTN    Chronic low back pain with lumbar disc into the right leg---mri     With  djd  And   Lesion with neg bone scan at [facility]    Cervical disc surgery 2016 with     Uterine ablation    Myalgias    Elev Chol with high  hdl  7-20  due 6 mo and failed    Mom      at  80 yrs old    Colon   age 52--due ten yrs    CTA heart    with some plaque    Daughter moved to [place]    Son  recovery etoh    broke up with gf         Social Determinants of Health     Financial Resource Strain: Not on file   Food Insecurity: Not on file   Transportation Needs: Not on file   Physical Activity: Not on file   Stress: Not on file   Social Connections: Not on file   Intimate Partner Violence: Not on file   Housing Stability: Not on file      Past Medical History:   Diagnosis Date    Hyperlipidemia     diet only    Screening for colon cancer     For colonoscopy 3-9-22     Family History   Problem Relation Age of Onset    Other Brother     Heart Attack Brother       Past Surgical History:   Procedure Laterality Date    CERVICAL DISCECTOMY  02/06/2017    anterior cervical discectomy and fusion C5-C6 with Dr. Urvashi See    COLONOSCOPY      COLONOSCOPY N/A 3/9/2022    COLORECTAL CANCER SCREENING, NOT HIGH RISK performed by Cari Lucas MD at   [facility]:    10/05/22 0644   BP: 136/82   Pulse: 92   Temp: 98.8 °F (37.1 °C)   TempSrc: Oral       Objective:    Physical Exam    Gricel Cantu was seen today for suture / staple removal.    Diagnoses and all orders for this visit:    Laceration of right lower extremity, subsequent encounter    Laceration of left buttock, subsequent encounter    Traumatic ecchymosis of multiple sites    PTSD (post-traumatic stress disorder)    Anxiety    Dog bite of buttock, right, subsequent encounter    Dog bite of left lower leg, subsequent encounter    Other orders  -     cephALEXin (KEFLEX) 500 MG capsule; Take 1 capsule by mouth 3 times daily for 7 days      Comments: We will continue her on Keflex advise probiotic 2 or 3 times a day. Advised counseling she will call to set up appoint with a psychologist.  May need to start medication for same. She is reluctant now because of fear of stomach upset with the antibiotics. Call if any diarrhea or any issues. If the buttocks lesion gets worse notify. See me back in 4days. If any chills or temperature go to ER. I educated the patient about all medications. Make sure they were correct and educated  on the risk associated with missing meds or taking them incorrectly. A list of medications is being sent home with patient today. Check blood pressure at home twice a day. Low-salt low caffeine diet. Call if systolic blood pressure is above 543 and diastolic blood pressures above 85. Only use a upper arm digital cuff. I informed patient about the risk associated with noncompliance of medication and taking medications incorrectly. Appropriate follow-up with myself and all specialist.  Encourage family members to take active role in assisting with medications and medical care. If any confusion should develop to notify my office immediately to avoid risk of worsening medical condition      A great deal of time spent reviewing medications, diet, exercise, social issues. Also reviewing the chart before entering the room with patient and finishing charting after leaving patient's room. More than half of that time was spent face to face with the patient in counseling and coordinating care. Follow Up: Return for tuesday.      Seen by:  Marnie Jacinto DO

## 2022-10-11 ENCOUNTER — OFFICE VISIT (OUTPATIENT)
Dept: PRIMARY CARE CLINIC | Age: 62
End: 2022-10-11
Payer: COMMERCIAL

## 2022-10-11 VITALS
WEIGHT: 157 LBS | SYSTOLIC BLOOD PRESSURE: 135 MMHG | BODY MASS INDEX: 26.16 KG/M2 | TEMPERATURE: 98.2 F | HEIGHT: 65 IN | DIASTOLIC BLOOD PRESSURE: 78 MMHG

## 2022-10-11 DIAGNOSIS — F43.10 PTSD (POST-TRAUMATIC STRESS DISORDER): ICD-10-CM

## 2022-10-11 DIAGNOSIS — T07.XXXA MULTIPLE PUNCTURE WOUNDS: ICD-10-CM

## 2022-10-11 DIAGNOSIS — Z48.02 ENCOUNTER FOR REMOVAL OF SUTURES: ICD-10-CM

## 2022-10-11 DIAGNOSIS — F41.9 ANXIETY: ICD-10-CM

## 2022-10-11 DIAGNOSIS — S31.815D: Primary | ICD-10-CM
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
## 2022-10-11 DIAGNOSIS — T14.8XXA TRAUMATIC ECCHYMOSIS OF MULTIPLE SITES: ICD-10-CM

## 2022-10-11 DIAGNOSIS — S81.852D DOG BITE OF LEFT LOWER LEG, SUBSEQUENT ENCOUNTER: ICD-10-CM

## 2022-10-11 PROCEDURE — G8428 CUR MEDS NOT DOCUMENT: HCPCS | Performed by: FAMILY MEDICINE

## 2022-10-11 PROCEDURE — 1036F TOBACCO NON-USER: CPT | Performed by: FAMILY MEDICINE

## 2022-10-11 PROCEDURE — G8419 CALC BMI OUT NRM PARAM NOF/U: HCPCS | Performed by: FAMILY MEDICINE

## 2022-10-11 PROCEDURE — G8484 FLU IMMUNIZE NO ADMIN: HCPCS | Performed by: FAMILY MEDICINE

## 2022-10-11 PROCEDURE — 3017F COLORECTAL CA SCREEN DOC REV: CPT | Performed by: FAMILY MEDICINE

## 2022-10-11 PROCEDURE — 99214 OFFICE O/P EST MOD 30 MIN: CPT | Performed by: FAMILY MEDICINE

## 2022-10-11 ASSESSMENT — ENCOUNTER SYMPTOMS
GASTROINTESTINAL NEGATIVE: 1
ROS SKIN COMMENTS: HPI

## 2022-10-11 NOTE — PROGRESS NOTES
10/11/22  Name: Ivana Feliz    : 1960    Sex: female    Age: 63 y.o. Subjective:  Chief Complaint: Patient is here for recheck of multiple lacerations dog bites,    She presents with her . Still having some pain over the incisions. He would work only 3 hours a day. Anxious about the lacerations and the pain. No discharge no acute chills or temperature she has 4 more days of antibiotics left      Review of Systems   Cardiovascular: Negative. Gastrointestinal: Negative. Musculoskeletal:         HPI   Skin:         HPI   Psychiatric/Behavioral:  The patient is nervous/anxious. Current Outpatient Medications:     cephALEXin (KEFLEX) 500 MG capsule, Take 1 capsule by mouth 3 times daily for 7 days, Disp: 21 capsule, Rfl: 0    Red Yeast Rice Extract (RED YEAST RICE PO), Take by mouth, Disp: , Rfl:     Omega-3 Fatty Acids (FISH OIL OMEGA-3 PO), Take 1 capsule by mouth, Disp: , Rfl:     vitamin D (CHOLECALCIFEROL) 1000 UNITS TABS tablet, Take 1,000 Units by mouth daily, Disp: , Rfl:   No Known Allergies  Social History     Socioeconomic History    Marital status:      Spouse name: Not on file    Number of children: Not on file    Years of education: Not on file    Highest education level: Not on file   Occupational History    Not on file   Tobacco Use    Smoking status: Never    Smokeless tobacco: Never   Vaping Use    Vaping Use: Never used   Substance and Sexual Activity    Alcohol use: Yes     Comment: socially    Drug use: No    Sexual activity: Not on file   Other Topics Concern    Not on file   Social History Narrative    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   is a  at [employer]- boy 29 girl 34 girl 20---------------------------one nabeel moved to [employer]      10-21    Father  from CHF and Parkinson's disease.     Family history of CAD and a brother who had 2 myocardial infarction as before 61    sis. Juany Vargas    Hyperlipidemia------------------------------------------start statin      Elevated blood pressure -20--in office    great at home     white coat HTN    Chronic low back pain with lumbar disc into the right leg---mri   2018  With  djd  And   Lesion with neg bone scan at [facility]    Cervical disc surgery  with     Uterine ablation    Myalgias    Elev Chol with high  hdl  7-  due 6 mo and failed    Mom      at  80 yrs old    Colon   age 52--due ten yrs    CTA heart    with some plaque    Daughter moved to [place]    Son  recovery etoh    broke up with gf         Social Determinants of Health     Financial Resource Strain: Not on file   Food Insecurity: Not on file   Transportation Needs: Not on file   Physical Activity: Not on file   Stress: Not on file   Social Connections: Not on file   Intimate Partner Violence: Not on file   Housing Stability: Not on file      Past Medical History:   Diagnosis Date    Hyperlipidemia     diet only    Screening for colon cancer     For colonoscopy 3-9-22     Family History   Problem Relation Age of Onset    Other Brother     Heart Attack Brother       Past Surgical History:   Procedure Laterality Date    CERVICAL DISCECTOMY  2017    anterior cervical discectomy and fusion C5-C6 with Dr. Lincoln Cole N/A 3/9/2022    COLORECTAL CANCER SCREENING, NOT HIGH RISK performed by Dante Garcia MD at   [facility] St:    10/11/22 0710   BP: 135/78   Temp: 98.2 °F (36.8 °C)   TempSrc: Oral   Weight: 157 lb (71.2 kg)   Height: 5' 5\" (1.651 m)       Objective:    Physical Exam  Cardiovascular:      Rate and Rhythm: Regular rhythm. Pulmonary:      Breath sounds: Normal breath sounds. Abdominal:      Palpations: Abdomen is soft.    Musculoskeletal:      Comments: Decreased range of motion both knees due to pain Skin:     Comments: Multiple abrasions with scabs. The posterior lateral larger laceration with all sutures removed has some faint erythema but no discharge and better attachment. Other laceration on the posterior aspect of the left calf has 2 remaining sutures that were removed that were difficult removed the scab is feeling well. Open area is much improved. Jeffy Mauro was seen today for follow-up. Diagnoses and all orders for this visit:    Dog bite of buttock, right, subsequent encounter    Dog bite of left lower leg, subsequent encounter    Encounter for removal of sutures    Multiple puncture wounds    Traumatic ecchymosis of multiple sites    PTSD (post-traumatic stress disorder)    Anxiety      Comments: Grade 2 anxiety I advised counseling she made a phone call to see a counselor about anxiety and PTSD and if they feel medication is necessary I will be happy to prescribe it if they do not have a psychiatrist there to do so. See me back in a week if not doing 100% better. I educated the patient about all medications. Make sure they were correct and educated  on the risk associated with missing meds or taking them incorrectly. A list of medications is being sent home with patient today. I informed patient about the risk associated with noncompliance of medication and taking medications incorrectly. Appropriate follow-up with myself and all specialist.  Encourage family members to take active role in assisting with medications and medical care. If any confusion should develop to notify my office immediately to avoid risk of worsening medical condition    A great deal of time spent reviewing medications, diet, exercise, social issues. Also reviewing the chart before entering the room with patient and finishing charting after leaving patient's room. More than half of that time was spent face to face with the patient in counseling and coordinating care. Follow Up: Return for Reg Appt. Seen by:  Laqueta Phalen, DO

## 2022-10-24 ENCOUNTER — OFFICE VISIT (OUTPATIENT)
Dept: PRIMARY CARE CLINIC | Age: 62
End: 2022-10-24
Payer: COMMERCIAL

## 2022-10-24 VITALS
WEIGHT: 158 LBS | BODY MASS INDEX: 26.33 KG/M2 | DIASTOLIC BLOOD PRESSURE: 78 MMHG | HEIGHT: 65 IN | SYSTOLIC BLOOD PRESSURE: 128 MMHG | TEMPERATURE: 97.8 F

## 2022-10-24 DIAGNOSIS — T07.XXXA MULTIPLE PUNCTURE WOUNDS: ICD-10-CM

## 2022-10-24 DIAGNOSIS — S81.852D DOG BITE OF LEFT LOWER LEG, SUBSEQUENT ENCOUNTER: ICD-10-CM

## 2022-10-24 DIAGNOSIS — F41.9 ANXIETY: ICD-10-CM

## 2022-10-24 DIAGNOSIS — S31.821D: ICD-10-CM

## 2022-10-24 DIAGNOSIS — S31.815D: Primary | ICD-10-CM
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
## 2022-10-24 DIAGNOSIS — F43.10 PTSD (POST-TRAUMATIC STRESS DISORDER): ICD-10-CM

## 2022-10-24 DIAGNOSIS — S81.811D LACERATION OF RIGHT LOWER EXTREMITY, SUBSEQUENT ENCOUNTER: ICD-10-CM

## 2022-10-24 PROCEDURE — G8428 CUR MEDS NOT DOCUMENT: HCPCS | Performed by: FAMILY MEDICINE

## 2022-10-24 PROCEDURE — G8419 CALC BMI OUT NRM PARAM NOF/U: HCPCS | Performed by: FAMILY MEDICINE

## 2022-10-24 PROCEDURE — 3017F COLORECTAL CA SCREEN DOC REV: CPT | Performed by: FAMILY MEDICINE

## 2022-10-24 PROCEDURE — 1036F TOBACCO NON-USER: CPT | Performed by: FAMILY MEDICINE

## 2022-10-24 PROCEDURE — 99214 OFFICE O/P EST MOD 30 MIN: CPT | Performed by: FAMILY MEDICINE

## 2022-10-24 PROCEDURE — G8484 FLU IMMUNIZE NO ADMIN: HCPCS | Performed by: FAMILY MEDICINE

## 2022-10-24 ASSESSMENT — ENCOUNTER SYMPTOMS
ROS SKIN COMMENTS: HPI
ALLERGIC/IMMUNOLOGIC NEGATIVE: 1
GASTROINTESTINAL NEGATIVE: 1
EYES NEGATIVE: 1
RESPIRATORY NEGATIVE: 1

## 2022-10-24 ASSESSMENT — PATIENT HEALTH QUESTIONNAIRE - PHQ9
SUM OF ALL RESPONSES TO PHQ QUESTIONS 1-9: 1
SUM OF ALL RESPONSES TO PHQ QUESTIONS 1-9: 1
1. LITTLE INTEREST OR PLEASURE IN DOING THINGS: 1
SUM OF ALL RESPONSES TO PHQ QUESTIONS 1-9: 1
SUM OF ALL RESPONSES TO PHQ QUESTIONS 1-9: 1
2. FEELING DOWN, DEPRESSED OR HOPELESS: 0
SUM OF ALL RESPONSES TO PHQ9 QUESTIONS 1 & 2: 1

## 2022-10-24 NOTE — PROGRESS NOTES
10/24/22  Name: Omayra Feliz    : 1960    Sex: female    Age: 63 y.o. Subjective:  Chief Complaint: Patient is here for her wounds. Anxiety, stress, pain    Patient presents unaccompanied today. She still has discomfort over the left exterior and anterior calf at the incisions and lacerations. She has some buttocks pain over the left side. When she sits on the toilet she has slight discomfort in that area. She has some pain with walking. No swelling of the calf anymore. She is putting dressings on her 2 incisions on her lower leg on the left she had some slight discharge but that is improved. She does have an appointment scheduled to see a psychologist soon. She has a great deal of anxiety since the accident she is only able to work 20 hours a week. She has gone and done some walking with her  she carries Mace and a stick with her. She is afraid to walk in the neighborhood. She feels that that the dog was forced to be put down by the . She cried about that. If she is on her legs for prolonged period time advise water hose      Review of Systems   Constitutional: Negative. HENT: Negative. Eyes: Negative. Respiratory: Negative. Cardiovascular: Negative. Gastrointestinal: Negative. Endocrine: Negative. Genitourinary: Negative. Musculoskeletal: Negative. Skin:         HPI   Allergic/Immunologic: Negative. Neurological: Negative. Hematological: Negative. Psychiatric/Behavioral:  The patient is nervous/anxious.         Current Outpatient Medications:     Red Yeast Rice Extract (RED YEAST RICE PO), Take by mouth, Disp: , Rfl:     Omega-3 Fatty Acids (FISH OIL OMEGA-3 PO), Take 1 capsule by mouth, Disp: , Rfl:     vitamin D (CHOLECALCIFEROL) 1000 UNITS TABS tablet, Take 1,000 Units by mouth daily, Disp: , Rfl:   No Known Allergies  Social History     Socioeconomic History    Marital status:      Spouse name: Not on file Number of children: Not on file    Years of education: Not on file    Highest education level: Not on file   Occupational History    Not on file   Tobacco Use    Smoking status: Never    Smokeless tobacco: Never   Vaping Use    Vaping Use: Never used   Substance and Sexual Activity    Alcohol use: Yes     Comment: socially    Drug use: No    Sexual activity: Not on file   Other Topics Concern    Not on file   Social History Narrative    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   is a  at [employer]- boy 29 girl 34 girl 20---------------------------one nabeel moved to [place]      10-21    Father  from CHF and Parkinson's disease. Family history of CAD and a brother who had 2 myocardial infarction as before 61    sis.  Alicia Macedo    Hyperlipidemia------------------------------------------start statin      Elevated blood pressure --in office    great at home     white coat HTN    Chronic low back pain with lumbar disc into the right leg---mri   2018  With  djd  And   Lesion with neg bone scan [facility]    Cervical disc surgery 2016 with     Uterine ablation    Myalgias    Elev Chol with high  hdl  7-20  due 6 mo and failed    Mom      at  [address] yrs old    Colon   age 52--due ten yrs    CTA heart    with some plaque    Daughter moved to [place]    Son  recovery etoh    broke up with gf         Social Determinants of Health     Financial Resource Strain: Not on file   Food Insecurity: Not on file   Transportation Needs: Not on file   Physical Activity: Not on file   Stress: Not on file   Social Connections: Not on file   Intimate Partner Violence: Not on file   Housing Stability: Not on file      Past Medical History:   Diagnosis Date    Hyperlipidemia     diet only    Screening for colon cancer     For colonoscopy 3-9-22     Family History   Problem Relation Age of Onset Other Brother     Heart Attack Brother       Past Surgical History:   Procedure Laterality Date    CERVICAL DISCECTOMY  02/06/2017    anterior cervical discectomy and fusion C5-C6 with Dr. Yolie Jacob    COLONOSCOPY      COLONOSCOPY N/A 3/9/2022    COLORECTAL CANCER SCREENING, NOT HIGH RISK performed by Kristi Singer MD at   [facility]:    10/24/22 0646   BP: 128/78   Temp: 97.8 °F (36.6 °C)   TempSrc: Oral   Weight: 158 lb (71.7 kg)   Height: 5' 5\" (1.651 m)       Objective:    Physical Exam  Vitals reviewed. Constitutional:       Appearance: Normal appearance. She is well-developed. HENT:      Head: Normocephalic. Right Ear: Tympanic membrane normal.      Left Ear: Tympanic membrane normal.      Nose: Nose normal.      Mouth/Throat:      Mouth: Mucous membranes are moist.   Eyes:      Pupils: Pupils are equal, round, and reactive to light. Cardiovascular:      Rate and Rhythm: Normal rate and regular rhythm. Pulmonary:      Effort: Pulmonary effort is normal.      Breath sounds: Normal breath sounds. Abdominal:      General: Bowel sounds are normal.      Palpations: Abdomen is soft. Musculoskeletal:         General: Normal range of motion. Cervical back: Normal range of motion. Skin:     Comments: Left anterior lower leg with slight central open area with scar tissue no discharge. Left posterior laceration patient is better improved but very hard central area with tenderness with palpation. Left buttock is tender over the previous hematoma with some ecchymosis persistent. Neurological:      Mental Status: She is alert and oriented to person, place, and time. Psychiatric:         Behavior: Behavior normal.       Marissa Wan was seen today for follow-up.     Diagnoses and all orders for this visit:    Dog bite of buttock, right, subsequent encounter    Dog bite of left lower leg, subsequent encounter    Laceration of right lower extremity, subsequent encounter    Laceration of left buttock, subsequent encounter    Multiple puncture wounds    PTSD (post-traumatic stress disorder)    Anxiety      Comments: Informed her to continue using dressings for another several days with ointment antibacterial.  We will wait and see what her anxiety to does with her counseling appointment because medication adjustment for anxiety. Encourage support hose. See me back in a week or 2 if she still has discomfort and get a neurological for possible nerve damage. See me back in a week or 2      I educated the patient about all medications. Make sure they were correct and educated  on the risk associated with missing meds or taking them incorrectly. A list of medications is being sent home with patient today. I informed patient about the risk associated with noncompliance of medication and taking medications incorrectly. Appropriate follow-up with myself and all specialist.  Encourage family members to take active role in assisting with medications and medical care. If any confusion should develop to notify my office immediately to avoid risk of worsening medical condition      A great deal of time spent reviewing medications, diet, exercise, social issues. Also reviewing the chart before entering the room with patient and finishing charting after leaving patient's room. More than half of that time was spent face to face with the patient in counseling and coordinating care. Follow Up: No follow-ups on file.      Seen by:  Laqueta Phalen, DO

## 2023-02-07 ENCOUNTER — HOSPITAL ENCOUNTER (OUTPATIENT)
Dept: GENERAL RADIOLOGY | Age: 63
Discharge: HOME | End: 2023-02-09
Payer: COMMERCIAL

## 2023-02-07 VITALS — BODY MASS INDEX: 26.46 KG/M2 | HEIGHT: 64 IN | WEIGHT: 155 LBS

## 2023-02-07 DIAGNOSIS — Z12.31 ENCOUNTER FOR SCREENING MAMMOGRAM FOR MALIGNANT NEOPLASM OF BREAST: ICD-10-CM

## 2023-02-07 PROCEDURE — 77063 BREAST TOMOSYNTHESIS BI: CPT

## 2023-03-29 ENCOUNTER — OFFICE VISIT (OUTPATIENT)
Dept: PRIMARY CARE CLINIC | Age: 63
End: 2023-03-29
Payer: COMMERCIAL

## 2023-03-29 VITALS
WEIGHT: 158 LBS | SYSTOLIC BLOOD PRESSURE: 128 MMHG | DIASTOLIC BLOOD PRESSURE: 82 MMHG | TEMPERATURE: 98.9 F | HEIGHT: 64 IN | BODY MASS INDEX: 26.98 KG/M2

## 2023-03-29 DIAGNOSIS — F41.9 ANXIETY: ICD-10-CM

## 2023-03-29 DIAGNOSIS — S81.852D DOG BITE OF LEFT LOWER LEG, SUBSEQUENT ENCOUNTER: ICD-10-CM
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
## 2023-03-29 DIAGNOSIS — S31.825D: ICD-10-CM

## 2023-03-29 DIAGNOSIS — T07.XXXA MULTIPLE PUNCTURE WOUNDS: Primary | ICD-10-CM
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
## 2023-03-29 DIAGNOSIS — L90.5 PAINFUL SCAR: ICD-10-CM

## 2023-03-29 DIAGNOSIS — R52 PAINFUL SCAR: ICD-10-CM

## 2023-03-29 PROCEDURE — 3079F DIAST BP 80-89 MM HG: CPT | Performed by: FAMILY MEDICINE

## 2023-03-29 PROCEDURE — 99215 OFFICE O/P EST HI 40 MIN: CPT | Performed by: FAMILY MEDICINE

## 2023-03-29 PROCEDURE — G8484 FLU IMMUNIZE NO ADMIN: HCPCS | Performed by: FAMILY MEDICINE

## 2023-03-29 PROCEDURE — 3017F COLORECTAL CA SCREEN DOC REV: CPT | Performed by: FAMILY MEDICINE

## 2023-03-29 PROCEDURE — G8428 CUR MEDS NOT DOCUMENT: HCPCS | Performed by: FAMILY MEDICINE

## 2023-03-29 PROCEDURE — G8419 CALC BMI OUT NRM PARAM NOF/U: HCPCS | Performed by: FAMILY MEDICINE

## 2023-03-29 PROCEDURE — 3074F SYST BP LT 130 MM HG: CPT | Performed by: FAMILY MEDICINE

## 2023-03-29 PROCEDURE — 1036F TOBACCO NON-USER: CPT | Performed by: FAMILY MEDICINE

## 2023-03-29 RX ORDER — HYDROXYZINE PAMOATE 25 MG/1
25 CAPSULE ORAL 2 TIMES DAILY PRN
Qty: 50 CAPSULE | Refills: 1 | Status: SHIPPED | OUTPATIENT
Start: 2023-03-29

## 2023-03-29 ASSESSMENT — PATIENT HEALTH QUESTIONNAIRE - PHQ9
1. LITTLE INTEREST OR PLEASURE IN DOING THINGS: 0
SUM OF ALL RESPONSES TO PHQ QUESTIONS 1-9: 0
2. FEELING DOWN, DEPRESSED OR HOPELESS: 0
SUM OF ALL RESPONSES TO PHQ9 QUESTIONS 1 & 2: 0
SUM OF ALL RESPONSES TO PHQ QUESTIONS 1-9: 0

## 2023-03-29 ASSESSMENT — ENCOUNTER SYMPTOMS
GASTROINTESTINAL NEGATIVE: 1
ALLERGIC/IMMUNOLOGIC NEGATIVE: 1
EYES NEGATIVE: 1
RESPIRATORY NEGATIVE: 1

## 2023-03-29 NOTE — PROGRESS NOTES
daily., Disp: 60 g, Rfl: 1    Red Yeast Rice Extract (RED YEAST RICE PO), Take by mouth, Disp: , Rfl:     Omega-3 Fatty Acids (FISH OIL OMEGA-3 PO), Take 1 capsule by mouth, Disp: , Rfl:     vitamin D (CHOLECALCIFEROL) 1000 UNITS TABS tablet, Take 1,000 Units by mouth daily, Disp: , Rfl:   No Known Allergies  Social History     Socioeconomic History    Marital status:      Spouse name: Not on file    Number of children: Not on file    Years of education: Not on file    Highest education level: Not on file   Occupational History    Not on file   Tobacco Use    Smoking status: Never    Smokeless tobacco: Never   Vaping Use    Vaping Use: Never used   Substance and Sexual Activity    Alcohol use: Yes     Comment: socially    Drug use: No    Sexual activity: Not on file   Other Topics Concern    Not on file   Social History Narrative    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   is a  at [employer]- boy 29 girl 34 girl 20---------------------------one nabeel moved to [place]      10-21    Father  from CHF and Parkinson's disease. Family history of CAD and a brother who had 2 myocardial infarction as before 61    sis.  Breana Larger    Hyperlipidemia------------------------------------------start statin      Elevated blood pressure --in office    great at home     white coat HTN    Chronic low back pain with lumbar disc into the right leg---mri     With  djd  And   Lesion with neg bone scan at [facility]    Cervical disc surgery  with     Uterine ablation    Myalgias    Elev Chol with high  hdl  7-20  due 6 mo and failed    Mom      at  80 yrs old    Colon   age 52--due ten yrs    CTA heart    with some plaque    Daughter moved to [place]    Son  recovery etoh    broke up with gf         Social Determinants of Health     Financial Resource Strain: Not on [employer]

## 2023-05-30 ENCOUNTER — TELEPHONE (OUTPATIENT)
Dept: PRIMARY CARE CLINIC | Age: 63
End: 2023-05-30

## 2023-05-30 NOTE — TELEPHONE ENCOUNTER
----- Message from Odalys Fraser sent at 5/30/2023  1:54 PM EDT -----  Subject: Message to Provider    QUESTIONS  Information for Provider? Ewa from attorney [employer] [phone] called   to get update on Medical Records request that was requested on 3/31/2023   please, call Ewa to update and discuss thank you   ---------------------------------------------------------------------------  --------------  [id number] [employer]  [phone]; OK to leave message on voicemail  ---------------------------------------------------------------------------  --------------  SCRIPT ANSWERS  Relationship to Patient? Covered Entity  Covered Entity Type?  Other  Other Covered Entity Type?    Representative Name? Shahnaz Jones deb Ascencio

## 2023-05-30 NOTE — TELEPHONE ENCOUNTER
Advised Ewa @ [address] [place] [address] that medical records request was faxed to Medical Records Dept on 4/3/23.   Gave her telephone number to contact them directly for update

## 2023-11-01 ENCOUNTER — OFFICE VISIT (OUTPATIENT)
Dept: PRIMARY CARE CLINIC | Age: 63
End: 2023-11-01
Payer: COMMERCIAL

## 2023-11-01 VITALS — WEIGHT: 156.2 LBS | BODY MASS INDEX: 26.67 KG/M2 | TEMPERATURE: 98.5 F | HEIGHT: 64 IN

## 2023-11-01 DIAGNOSIS — I10 ESSENTIAL HYPERTENSION: Chronic | ICD-10-CM

## 2023-11-01 DIAGNOSIS — N30.00 ACUTE CYSTITIS WITHOUT HEMATURIA: ICD-10-CM

## 2023-11-01 DIAGNOSIS — N30.00 ACUTE CYSTITIS WITHOUT HEMATURIA: Primary | ICD-10-CM

## 2023-11-01 LAB
BILIRUBIN, POC: NEGATIVE
BLOOD URINE, POC: NORMAL
CLARITY, POC: NORMAL
COLOR, POC: YELLOW
GLUCOSE URINE, POC: NEGATIVE
KETONES, POC: NEGATIVE
LEUKOCYTE EST, POC: NORMAL
NITRITE, POC: POSITIVE
PH, POC: 6
PROTEIN, POC: 30
SPECIFIC GRAVITY, POC: 1.01
UROBILINOGEN, POC: 0.2

## 2023-11-01 PROCEDURE — 99213 OFFICE O/P EST LOW 20 MIN: CPT | Performed by: FAMILY MEDICINE

## 2023-11-01 PROCEDURE — G8484 FLU IMMUNIZE NO ADMIN: HCPCS | Performed by: FAMILY MEDICINE

## 2023-11-01 PROCEDURE — 3017F COLORECTAL CA SCREEN DOC REV: CPT | Performed by: FAMILY MEDICINE

## 2023-11-01 PROCEDURE — G8419 CALC BMI OUT NRM PARAM NOF/U: HCPCS | Performed by: FAMILY MEDICINE

## 2023-11-01 PROCEDURE — 1036F TOBACCO NON-USER: CPT | Performed by: FAMILY MEDICINE

## 2023-11-01 PROCEDURE — 81002 URINALYSIS NONAUTO W/O SCOPE: CPT | Performed by: FAMILY MEDICINE

## 2023-11-01 PROCEDURE — G8428 CUR MEDS NOT DOCUMENT: HCPCS | Performed by: FAMILY MEDICINE

## 2023-11-01 RX ORDER — NITROFURANTOIN 25; 75 MG/1; MG/1
100 CAPSULE ORAL 2 TIMES DAILY
Qty: 14 CAPSULE | Refills: 0 | Status: SHIPPED | OUTPATIENT
Start: 2023-11-01

## 2023-11-01 RX ORDER — NITROFURANTOIN 25; 75 MG/1; MG/1
100 CAPSULE ORAL 2 TIMES DAILY
Qty: 14 CAPSULE | Refills: 0 | Status: SHIPPED
Start: 2023-11-01 | End: 2023-11-01

## 2023-11-01 SDOH — ECONOMIC STABILITY: INCOME INSECURITY: HOW HARD IS IT FOR YOU TO PAY FOR THE VERY BASICS LIKE FOOD, HOUSING, MEDICAL CARE, AND HEATING?: NOT HARD AT ALL

## 2023-11-01 SDOH — ECONOMIC STABILITY: FOOD INSECURITY: WITHIN THE PAST 12 MONTHS, YOU WORRIED THAT YOUR FOOD WOULD RUN OUT BEFORE YOU GOT MONEY TO BUY MORE.: NEVER TRUE

## 2023-11-01 SDOH — ECONOMIC STABILITY: FOOD INSECURITY: WITHIN THE PAST 12 MONTHS, THE FOOD YOU BOUGHT JUST DIDN'T LAST AND YOU DIDN'T HAVE MONEY TO GET MORE.: NEVER TRUE

## 2023-11-01 SDOH — ECONOMIC STABILITY: HOUSING INSECURITY
IN THE LAST 12 MONTHS, WAS THERE A TIME WHEN YOU DID NOT HAVE A STEADY PLACE TO SLEEP OR SLEPT IN A SHELTER (INCLUDING NOW)?: NO

## 2023-11-01 ASSESSMENT — PATIENT HEALTH QUESTIONNAIRE - PHQ9
SUM OF ALL RESPONSES TO PHQ QUESTIONS 1-9: 0
SUM OF ALL RESPONSES TO PHQ QUESTIONS 1-9: 0
2. FEELING DOWN, DEPRESSED OR HOPELESS: 0
1. LITTLE INTEREST OR PLEASURE IN DOING THINGS: 0
SUM OF ALL RESPONSES TO PHQ QUESTIONS 1-9: 0
SUM OF ALL RESPONSES TO PHQ QUESTIONS 1-9: 0
SUM OF ALL RESPONSES TO PHQ9 QUESTIONS 1 & 2: 0

## 2023-11-01 ASSESSMENT — ENCOUNTER SYMPTOMS
ALLERGIC/IMMUNOLOGIC NEGATIVE: 1
EYES NEGATIVE: 1
RESPIRATORY NEGATIVE: 1
GASTROINTESTINAL NEGATIVE: 1

## 2023-11-01 NOTE — PROGRESS NOTES
23  Name: Le Feliz    : 1960    Sex: female    Age: 66 y.o. Subjective:  Chief Complaint: Patient is here for dysuria     freq     Few d   o  t    ch  no  back pain   sl lower  abd pressure  but  dec        Review of Systems   Constitutional: Negative. HENT: Negative. Eyes: Negative. Respiratory: Negative. Cardiovascular: Negative. Gastrointestinal: Negative. Endocrine: Negative. Genitourinary:  Positive for dysuria and frequency. Musculoskeletal: Negative. Skin: Negative. Allergic/Immunologic: Negative. Neurological: Negative. Hematological: Negative. Psychiatric/Behavioral: Negative. Current Outpatient Medications:     nitrofurantoin, macrocrystal-monohydrate, (MACROBID) 100 MG capsule, Take 1 capsule by mouth 2 times daily, Disp: 14 capsule, Rfl: 0    hydrOXYzine pamoate (VISTARIL) 25 MG capsule, Take 1 capsule by mouth 2 times daily as needed for Anxiety, Disp: 50 capsule, Rfl: 1    zinc gluconate 50 MG tablet, Take 50 mg by mouth daily, Disp: , Rfl:     clobetasol (TEMOVATE) 0.05 % ointment, Apply topically 2 times daily. , Disp: 60 g, Rfl: 1    Red Yeast Rice Extract (RED YEAST RICE PO), Take by mouth, Disp: , Rfl:     Omega-3 Fatty Acids (FISH OIL OMEGA-3 PO), Take 1 capsule by mouth, Disp: , Rfl:     vitamin D (CHOLECALCIFEROL) 1000 UNITS TABS tablet, Take 1,000 Units by mouth daily, Disp: , Rfl:   No Known Allergies  Social History     Socioeconomic History    Marital status:      Spouse name: Not on file    Number of children: Not on file    Years of education: Not on file    Highest education level: Not on file   Occupational History    Not on file   Tobacco Use    Smoking status: Never    Smokeless tobacco: Never   Vaping Use    Vaping Use: Never used   Substance and Sexual Activity    Alcohol use: Yes     Comment: socially    Drug use: No    Sexual activity: Not on file   Other Topics Concern    Not on file   Social

## 2023-11-04 LAB
CULTURE: ABNORMAL
SPECIMEN DESCRIPTION: ABNORMAL

## 2023-11-09 ENCOUNTER — OFFICE VISIT (OUTPATIENT)
Dept: FAMILY MEDICINE CLINIC | Age: 63
End: 2023-11-09
Payer: COMMERCIAL

## 2023-11-09 VITALS
DIASTOLIC BLOOD PRESSURE: 76 MMHG | BODY MASS INDEX: 26.76 KG/M2 | WEIGHT: 156 LBS | RESPIRATION RATE: 17 BRPM | SYSTOLIC BLOOD PRESSURE: 120 MMHG | TEMPERATURE: 97.7 F | HEART RATE: 77 BPM | OXYGEN SATURATION: 98 %

## 2023-11-09 DIAGNOSIS — N39.0 URINARY TRACT INFECTION WITHOUT HEMATURIA, SITE UNSPECIFIED: Primary | ICD-10-CM

## 2023-11-09 DIAGNOSIS — R30.0 DYSURIA: ICD-10-CM

## 2023-11-09 LAB
BILIRUBIN, POC: NEGATIVE
BLOOD URINE, POC: NEGATIVE
CLARITY, POC: CLEAR
COLOR, POC: YELLOW
GLUCOSE URINE, POC: NEGATIVE
KETONES, POC: NEGATIVE
LEUKOCYTE EST, POC: NORMAL
NITRITE, POC: NEGATIVE
PH, POC: 6
PROTEIN, POC: NEGATIVE
SPECIFIC GRAVITY, POC: 1.01
UROBILINOGEN, POC: 0.2

## 2023-11-09 PROCEDURE — 3074F SYST BP LT 130 MM HG: CPT | Performed by: EMERGENCY MEDICINE

## 2023-11-09 PROCEDURE — 3078F DIAST BP <80 MM HG: CPT | Performed by: EMERGENCY MEDICINE

## 2023-11-09 PROCEDURE — G8419 CALC BMI OUT NRM PARAM NOF/U: HCPCS | Performed by: EMERGENCY MEDICINE

## 2023-11-09 PROCEDURE — 81002 URINALYSIS NONAUTO W/O SCOPE: CPT | Performed by: EMERGENCY MEDICINE

## 2023-11-09 PROCEDURE — 1036F TOBACCO NON-USER: CPT | Performed by: EMERGENCY MEDICINE

## 2023-11-09 PROCEDURE — 3017F COLORECTAL CA SCREEN DOC REV: CPT | Performed by: EMERGENCY MEDICINE

## 2023-11-09 PROCEDURE — G8484 FLU IMMUNIZE NO ADMIN: HCPCS | Performed by: EMERGENCY MEDICINE

## 2023-11-09 PROCEDURE — 99213 OFFICE O/P EST LOW 20 MIN: CPT | Performed by: EMERGENCY MEDICINE

## 2023-11-09 PROCEDURE — G8427 DOCREV CUR MEDS BY ELIG CLIN: HCPCS | Performed by: EMERGENCY MEDICINE

## 2023-11-09 RX ORDER — CIPROFLOXACIN 500 MG/1
500 TABLET, FILM COATED ORAL 2 TIMES DAILY
Qty: 14 TABLET | Refills: 0 | Status: SHIPPED | OUTPATIENT
Start: 2023-11-09 | End: 2023-11-16

## 2023-11-09 ASSESSMENT — ENCOUNTER SYMPTOMS
SORE THROAT: 0
ABDOMINAL DISTENTION: 0
DIARRHEA: 0
SHORTNESS OF BREATH: 0
VOMITING: 0
WHEEZING: 0
EYE PAIN: 0
BACK PAIN: 0
NAUSEA: 0
COUGH: 0
EYE REDNESS: 0
SINUS PRESSURE: 0
EYE DISCHARGE: 0

## 2023-11-09 NOTE — PROGRESS NOTES
Chief Complaint:   Urinary Tract Infection (Follow up from UTI: finished antibiotic Tuesday /Burning after urination )    Recent Uti = Rx Macrobid  History of Present Illness   Source of history provided by:  patient. Roro Feliz is a 66 y.o. old female who has a past medical history of:   Past Medical History:   Diagnosis Date    Hyperlipidemia     diet only    Screening for colon cancer     For colonoscopy 3-9-22    presents to the [facility] care for dysuria. Pt states the symptoms have progressed over the past several days. Pt states they have had increased frequency, lower abdominal pressure, and occasional nausea. No flank pain. Denies any vaginal discharge, vaginal bleeding, vomiting, diarrhea, or lethargy. Patient's last menstrual period was 10/06/2014. ROS   Review of Systems   Constitutional:  Negative for chills and fever. HENT:  Negative for ear pain, sinus pressure and sore throat. Eyes:  Negative for pain, discharge and redness. Respiratory:  Negative for cough, shortness of breath and wheezing. Cardiovascular:  Negative for chest pain. Gastrointestinal:  Negative for abdominal distention, diarrhea, nausea and vomiting. Genitourinary:  Positive for dysuria. Negative for frequency. Musculoskeletal:  Negative for arthralgias and back pain. Skin:  Negative for rash and wound. Neurological:  Negative for weakness and headaches. Hematological:  Negative for adenopathy. Psychiatric/Behavioral: Negative. All other systems reviewed and are negative. Past Surgical history:   Past Surgical History:   Procedure Laterality Date    CERVICAL DISCECTOMY  02/06/2017    anterior cervical discectomy and fusion C5-C6 with Dr. Greg Navarro    COLONOSCOPY      COLONOSCOPY N/A 3/9/2022    COLORECTAL CANCER SCREENING, NOT HIGH RISK performed by Gisela Ford MD at [address] [place] [address]       Social History:  reports that she has never smoked.  She

## 2024-05-08 ENCOUNTER — HOSPITAL ENCOUNTER (OUTPATIENT)
Dept: GENERAL RADIOLOGY | Age: 64
Discharge: HOME | End: 2024-05-10
Payer: COMMERCIAL

## 2024-05-08 VITALS — WEIGHT: 150 LBS | BODY MASS INDEX: 25.75 KG/M2

## 2024-05-08 DIAGNOSIS — Z12.31 ENCOUNTER FOR SCREENING MAMMOGRAM FOR MALIGNANT NEOPLASM OF BREAST: ICD-10-CM

## 2024-05-08 PROCEDURE — 77063 BREAST TOMOSYNTHESIS BI: CPT

## 2024-11-01 ENCOUNTER — OFFICE VISIT (OUTPATIENT)
Dept: PRIMARY CARE CLINIC | Age: 64
End: 2024-11-01

## 2024-11-01 VITALS
WEIGHT: 153 LBS | TEMPERATURE: 97.3 F | HEART RATE: 91 BPM | HEIGHT: 64 IN | RESPIRATION RATE: 18 BRPM | BODY MASS INDEX: 26.12 KG/M2 | OXYGEN SATURATION: 97 % | DIASTOLIC BLOOD PRESSURE: 82 MMHG | SYSTOLIC BLOOD PRESSURE: 142 MMHG

## 2024-11-01 DIAGNOSIS — M70.71 BURSITIS OF OTHER BURSA OF RIGHT HIP: Primary | ICD-10-CM

## 2024-11-01 DIAGNOSIS — Z00.01 ENCOUNTER FOR ANNUAL GENERAL MEDICAL EXAMINATION WITH ABNORMAL FINDINGS IN ADULT: ICD-10-CM

## 2024-11-01 DIAGNOSIS — E53.8 VITAMIN B 12 DEFICIENCY: ICD-10-CM

## 2024-11-01 DIAGNOSIS — D68.51 FACTOR V LEIDEN (HCC): Chronic | ICD-10-CM

## 2024-11-01 DIAGNOSIS — E55.9 VITAMIN D DEFICIENCY: ICD-10-CM

## 2024-11-01 DIAGNOSIS — R13.19 OTHER DYSPHAGIA: ICD-10-CM

## 2024-11-01 DIAGNOSIS — E03.9 ACQUIRED HYPOTHYROIDISM: ICD-10-CM

## 2024-11-01 DIAGNOSIS — E78.2 MIXED HYPERLIPIDEMIA: ICD-10-CM

## 2024-11-01 RX ORDER — METHYLPREDNISOLONE 4 MG/1
TABLET ORAL
Qty: 1 KIT | Refills: 1 | Status: SHIPPED | OUTPATIENT
Start: 2024-11-01

## 2024-11-01 SDOH — ECONOMIC STABILITY: FOOD INSECURITY: WITHIN THE PAST 12 MONTHS, YOU WORRIED THAT YOUR FOOD WOULD RUN OUT BEFORE YOU GOT MONEY TO BUY MORE.: NEVER TRUE

## 2024-11-01 SDOH — ECONOMIC STABILITY: INCOME INSECURITY: HOW HARD IS IT FOR YOU TO PAY FOR THE VERY BASICS LIKE FOOD, HOUSING, MEDICAL CARE, AND HEATING?: NOT HARD AT ALL

## 2024-11-01 SDOH — ECONOMIC STABILITY: FOOD INSECURITY: WITHIN THE PAST 12 MONTHS, THE FOOD YOU BOUGHT JUST DIDN'T LAST AND YOU DIDN'T HAVE MONEY TO GET MORE.: NEVER TRUE

## 2024-11-01 ASSESSMENT — PATIENT HEALTH QUESTIONNAIRE - PHQ9
2. FEELING DOWN, DEPRESSED OR HOPELESS: NOT AT ALL
1. LITTLE INTEREST OR PLEASURE IN DOING THINGS: NOT AT ALL
SUM OF ALL RESPONSES TO PHQ9 QUESTIONS 1 & 2: 0
SUM OF ALL RESPONSES TO PHQ QUESTIONS 1-9: 0

## 2024-11-01 NOTE — PROGRESS NOTES
hyperlipidemia  -     Lipid Panel; Future  -     High sensitivity CRP; Future    Factor V Leiden (HCC)    Encounter for annual general medical examination with abnormal findings in adult  -     CBC with Auto Differential; Future  -     Comprehensive Metabolic Panel; Future  -     Lipid Panel; Future  -     T4; Future  -     TSH; Future  -     Vitamin B12; Future  -     Vitamin D 25 Hydroxy; Future  -     Urinalysis; Future  -     High sensitivity CRP; Future    Vitamin B 12 deficiency  -     Vitamin B12; Future    Vitamin D deficiency  -     Vitamin D 25 Hydroxy; Future    Acquired hypothyroidism  -     T4; Future  -     TSH; Future        Comments: aptp gi  Ck bp home  Mdp  se eifnto better    Lab and see me after      I educated the patient about all medications.  Make sure they were correct and educated  on the risk associated with missing meds or taking them incorrectly.  A list of medications is being sent home with patient today.    Check blood pressure at home twice a day.  Low-salt low caffeine diet.  Call if systolic blood pressure is above 150 and diastolic blood pressures above 85.  Only use a upper arm digital cuff.  A great deal of time spent reviewing medications, diet, exercise, social issues. Also reviewing the chart before entering the room with patient and finishing charting after leaving patient's room. More than half of that time was spent face to face with the patient in counseling and coordinating care.  Aggressive low-fat diet.  Avoid red meats, greasy fried foods, dairy products.  Avoid processed foods.  Take cholesterol medications without food.    I informed patient about the risk associated with noncompliance of medication and taking medications incorrectly.  Appropriate follow-up with myself and all specialist.  Encourage family members to take active role in assisting with medications and medical care.  If any confusion should develop to notify my office immediately to avoid risk of

## 2024-11-07 ENCOUNTER — HOSPITAL ENCOUNTER (OUTPATIENT)
Age: 64
Discharge: HOME | End: 2024-11-07
Payer: COMMERCIAL

## 2024-11-07 DIAGNOSIS — Z00.01 ENCOUNTER FOR ANNUAL GENERAL MEDICAL EXAMINATION WITH ABNORMAL FINDINGS IN ADULT: ICD-10-CM

## 2024-11-07 DIAGNOSIS — E78.2 MIXED HYPERLIPIDEMIA: ICD-10-CM

## 2024-11-07 DIAGNOSIS — E03.9 ACQUIRED HYPOTHYROIDISM: ICD-10-CM

## 2024-11-07 DIAGNOSIS — E55.9 VITAMIN D DEFICIENCY: ICD-10-CM

## 2024-11-07 DIAGNOSIS — E53.8 VITAMIN B 12 DEFICIENCY: ICD-10-CM

## 2024-11-07 LAB
25(OH)D3 SERPL-MCNC: 51.7 NG/ML (ref 30–100)
ALBUMIN SERPL-MCNC: 4.8 G/DL (ref 3.5–5.2)
ALP SERPL-CCNC: 49 U/L (ref 35–104)
ALT SERPL-CCNC: 26 U/L (ref 0–32)
ANION GAP SERPL CALCULATED.3IONS-SCNC: 14 MMOL/L (ref 7–16)
AST SERPL-CCNC: 28 U/L (ref 0–31)
BASOPHILS # BLD: 0.06 K/UL (ref 0–0.2)
BASOPHILS NFR BLD: 1 % (ref 0–2)
BILIRUB SERPL-MCNC: 0.6 MG/DL (ref 0–1.2)
BILIRUB UR QL STRIP: NEGATIVE
BUN SERPL-MCNC: 14 MG/DL (ref 6–23)
CALCIUM SERPL-MCNC: 9.7 MG/DL (ref 8.6–10.2)
CHLORIDE SERPL-SCNC: 100 MMOL/L (ref 98–107)
CHOLEST SERPL-MCNC: 279 MG/DL
CLARITY UR: CLEAR
CO2 SERPL-SCNC: 24 MMOL/L (ref 22–29)
COLOR UR: YELLOW
COMMENT: ABNORMAL
CREAT SERPL-MCNC: 0.8 MG/DL (ref 0.5–1)
CRP SERPL HS-MCNC: <0.3 MG/L (ref 0–3)
EOSINOPHIL # BLD: 0.18 K/UL (ref 0.05–0.5)
EOSINOPHILS RELATIVE PERCENT: 3 % (ref 0–6)
ERYTHROCYTE [DISTWIDTH] IN BLOOD BY AUTOMATED COUNT: 12.7 % (ref 11.5–15)
GFR, ESTIMATED: 80 ML/MIN/1.73M2
GLUCOSE SERPL-MCNC: 95 MG/DL (ref 74–99)
GLUCOSE UR STRIP-MCNC: NEGATIVE MG/DL
HCT VFR BLD AUTO: 44.1 % (ref 34–48)
HDLC SERPL-MCNC: 103 MG/DL
HGB BLD-MCNC: 14 G/DL (ref 11.5–15.5)
HGB UR QL STRIP.AUTO: NEGATIVE
IMM GRANULOCYTES # BLD AUTO: <0.03 K/UL (ref 0–0.58)
IMM GRANULOCYTES NFR BLD: 0 % (ref 0–5)
KETONES UR STRIP-MCNC: NEGATIVE MG/DL
LDLC SERPL CALC-MCNC: 157 MG/DL
LEUKOCYTE ESTERASE UR QL STRIP: NEGATIVE
LYMPHOCYTES NFR BLD: 1.73 K/UL (ref 1.5–4)
LYMPHOCYTES RELATIVE PERCENT: 31 % (ref 20–42)
MCH RBC QN AUTO: 31 PG (ref 26–35)
MCHC RBC AUTO-ENTMCNC: 31.7 G/DL (ref 32–34.5)
MCV RBC AUTO: 97.6 FL (ref 80–99.9)
MONOCYTES NFR BLD: 0.36 K/UL (ref 0.1–0.95)
MONOCYTES NFR BLD: 7 % (ref 2–12)
NEUTROPHILS NFR BLD: 58 % (ref 43–80)
NEUTS SEG NFR BLD: 3.23 K/UL (ref 1.8–7.3)
NITRITE UR QL STRIP: NEGATIVE
PH UR STRIP: 6 [PH] (ref 5–9)
PLATELET # BLD AUTO: 276 K/UL (ref 130–450)
PMV BLD AUTO: 10.8 FL (ref 7–12)
POTASSIUM SERPL-SCNC: 3.9 MMOL/L (ref 3.5–5)
PROT SERPL-MCNC: 7.9 G/DL (ref 6.4–8.3)
PROT UR STRIP-MCNC: NEGATIVE MG/DL
RBC # BLD AUTO: 4.52 M/UL (ref 3.5–5.5)
SODIUM SERPL-SCNC: 138 MMOL/L (ref 132–146)
SP GR UR STRIP: <1.005 (ref 1–1.03)
T4 SERPL-MCNC: 6.9 UG/DL (ref 4.5–11.7)
TRIGL SERPL-MCNC: 97 MG/DL
TSH SERPL DL<=0.05 MIU/L-ACNC: 1.89 UIU/ML (ref 0.27–4.2)
UROBILINOGEN UR STRIP-ACNC: 0.2 EU/DL (ref 0–1)
VIT B12 SERPL-MCNC: 546 PG/ML (ref 211–946)
VLDLC SERPL CALC-MCNC: 19 MG/DL
WBC OTHER # BLD: 5.6 K/UL (ref 4.5–11.5)

## 2024-11-07 PROCEDURE — 80053 COMPREHEN METABOLIC PANEL: CPT

## 2024-11-07 PROCEDURE — 80061 LIPID PANEL: CPT

## 2024-11-07 PROCEDURE — 82607 VITAMIN B-12: CPT

## 2024-11-07 PROCEDURE — 86141 C-REACTIVE PROTEIN HS: CPT

## 2024-11-07 PROCEDURE — 84443 ASSAY THYROID STIM HORMONE: CPT

## 2024-11-07 PROCEDURE — 84436 ASSAY OF TOTAL THYROXINE: CPT

## 2024-11-07 PROCEDURE — 81003 URINALYSIS AUTO W/O SCOPE: CPT

## 2024-11-07 PROCEDURE — 82306 VITAMIN D 25 HYDROXY: CPT

## 2024-11-07 PROCEDURE — 85025 COMPLETE CBC W/AUTO DIFF WBC: CPT

## 2024-11-07 PROCEDURE — 36415 COLL VENOUS BLD VENIPUNCTURE: CPT

## 2024-11-15 ENCOUNTER — OFFICE VISIT (OUTPATIENT)
Dept: PRIMARY CARE CLINIC | Age: 64
End: 2024-11-15
Payer: COMMERCIAL

## 2024-11-15 VITALS
BODY MASS INDEX: 26.29 KG/M2 | TEMPERATURE: 97.2 F | OXYGEN SATURATION: 98 % | HEIGHT: 64 IN | SYSTOLIC BLOOD PRESSURE: 138 MMHG | HEART RATE: 80 BPM | DIASTOLIC BLOOD PRESSURE: 83 MMHG | WEIGHT: 154 LBS

## 2024-11-15 DIAGNOSIS — I10 ESSENTIAL HYPERTENSION: ICD-10-CM

## 2024-11-15 DIAGNOSIS — D68.51 FACTOR V LEIDEN (HCC): Chronic | ICD-10-CM

## 2024-11-15 DIAGNOSIS — Z00.01 ENCOUNTER FOR ANNUAL GENERAL MEDICAL EXAMINATION WITH ABNORMAL FINDINGS IN ADULT: Primary | ICD-10-CM

## 2024-11-15 DIAGNOSIS — E78.2 MIXED HYPERLIPIDEMIA: Chronic | ICD-10-CM
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
## 2024-11-15 PROBLEM — S81.811A LACERATION OF RIGHT LOWER EXTREMITY: Status: RESOLVED | Noted: 2022-09-22 | Resolved: 2024-11-15

## 2024-11-15 PROBLEM — S31.821A: Status: RESOLVED | Noted: 2022-09-22 | Resolved: 2024-11-15

## 2024-11-15 PROBLEM — S31.825D: Status: RESOLVED | Noted: 2022-09-22 | Resolved: 2024-11-15

## 2024-11-15 PROBLEM — T07.XXXA MULTIPLE PUNCTURE WOUNDS: Status: RESOLVED | Noted: 2022-09-22 | Resolved: 2024-11-15

## 2024-11-15 PROCEDURE — 99396 PREV VISIT EST AGE 40-64: CPT | Performed by: FAMILY MEDICINE

## 2024-11-15 PROCEDURE — 93000 ELECTROCARDIOGRAM COMPLETE: CPT | Performed by: FAMILY MEDICINE

## 2024-11-15 PROCEDURE — 3075F SYST BP GE 130 - 139MM HG: CPT | Performed by: FAMILY MEDICINE

## 2024-11-15 PROCEDURE — 3079F DIAST BP 80-89 MM HG: CPT | Performed by: FAMILY MEDICINE

## 2024-11-15 ASSESSMENT — ENCOUNTER SYMPTOMS
EYES NEGATIVE: 1
RESPIRATORY NEGATIVE: 1
GASTROINTESTINAL NEGATIVE: 1
ALLERGIC/IMMUNOLOGIC NEGATIVE: 1

## 2024-11-15 NOTE — PROGRESS NOTES
11/15/24  Name: Linda Feliz    : 1960    Sex: female    Age: 64 y.o.        Subjective:  Chief Complaint: Patient is here for wellenss     bp chol   hip  arth      Factor 5  Dysphagia         Pt says gi no on ins but she asys will be ok in jaurya  ;lab wtuh chol higher  Bp great st home    ok her otdya  Pt still  refuses  statin         Review of Systems   Constitutional: Negative.    HENT: Negative.     Eyes: Negative.    Respiratory: Negative.     Cardiovascular: Negative.    Gastrointestinal: Negative.    Endocrine: Negative.    Genitourinary: Negative.    Musculoskeletal:  Positive for arthralgias.   Skin: Negative.    Allergic/Immunologic: Negative.    Neurological: Negative.    Hematological: Negative.    Psychiatric/Behavioral: Negative.           Current Outpatient Medications:     zinc gluconate 50 MG tablet, Take 1 tablet by mouth daily, Disp: , Rfl:     Red Yeast Rice Extract (RED YEAST RICE PO), Take by mouth, Disp: , Rfl:     vitamin D (CHOLECALCIFEROL) 1000 UNITS TABS tablet, Take 1 tablet by mouth daily, Disp: , Rfl:   No Known Allergies  Social History     Socioeconomic History    Marital status:      Spouse name: Not on file    Number of children: Not on file    Years of education: Not on file    Highest education level: Not on file   Occupational History    Not on file   Tobacco Use    Smoking status: Never    Smokeless tobacco: Never   Vaping Use    Vaping status: Never Used   Substance and Sexual Activity    Alcohol use: Yes     Comment: socially    Drug use: No    Sexual activity: Not on file   Other Topics Concern    Not on file   Social History Narrative    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

## 2025-01-20 ENCOUNTER — OFFICE VISIT (OUTPATIENT)
Dept: PRIMARY CARE CLINIC | Age: 65
End: 2025-01-20
Payer: COMMERCIAL

## 2025-01-20 VITALS
OXYGEN SATURATION: 99 % | BODY MASS INDEX: 27.81 KG/M2 | TEMPERATURE: 97.6 F | DIASTOLIC BLOOD PRESSURE: 68 MMHG | HEART RATE: 84 BPM | SYSTOLIC BLOOD PRESSURE: 126 MMHG | WEIGHT: 162 LBS

## 2025-01-20 DIAGNOSIS — R10.9 FLANK PAIN, ACUTE: ICD-10-CM

## 2025-01-20 DIAGNOSIS — N30.00 ACUTE CYSTITIS WITHOUT HEMATURIA: Primary | ICD-10-CM

## 2025-01-20 LAB
BILIRUBIN, POC: NEGATIVE
BLOOD URINE, POC: NEGATIVE
CLARITY, POC: NORMAL
COLOR, POC: YELLOW
GLUCOSE URINE, POC: NEGATIVE MG/DL
KETONES, POC: NEGATIVE MG/DL
LEUKOCYTE EST, POC: NORMAL
NITRITE, POC: NEGATIVE
PH, POC: 6
PROTEIN, POC: NEGATIVE MG/DL
SPECIFIC GRAVITY, POC: 1
UROBILINOGEN, POC: 0.2 MG/DL

## 2025-01-20 PROCEDURE — 81002 URINALYSIS NONAUTO W/O SCOPE: CPT | Performed by: FAMILY MEDICINE

## 2025-01-20 PROCEDURE — 99213 OFFICE O/P EST LOW 20 MIN: CPT | Performed by: FAMILY MEDICINE

## 2025-01-20 PROCEDURE — 3078F DIAST BP <80 MM HG: CPT | Performed by: FAMILY MEDICINE

## 2025-01-20 PROCEDURE — 3074F SYST BP LT 130 MM HG: CPT | Performed by: FAMILY MEDICINE

## 2025-01-20 RX ORDER — NITROFURANTOIN 25; 75 MG/1; MG/1
100 CAPSULE ORAL 2 TIMES DAILY
Qty: 14 CAPSULE | Refills: 0 | Status: SHIPPED | OUTPATIENT
Start: 2025-01-20

## 2025-01-20 SDOH — ECONOMIC STABILITY: FOOD INSECURITY: WITHIN THE PAST 12 MONTHS, THE FOOD YOU BOUGHT JUST DIDN'T LAST AND YOU DIDN'T HAVE MONEY TO GET MORE.: NEVER TRUE

## 2025-01-20 SDOH — ECONOMIC STABILITY: FOOD INSECURITY: WITHIN THE PAST 12 MONTHS, YOU WORRIED THAT YOUR FOOD WOULD RUN OUT BEFORE YOU GOT MONEY TO BUY MORE.: NEVER TRUE

## 2025-01-20 ASSESSMENT — PATIENT HEALTH QUESTIONNAIRE - PHQ9
SUM OF ALL RESPONSES TO PHQ QUESTIONS 1-9: 0
1. LITTLE INTEREST OR PLEASURE IN DOING THINGS: NOT AT ALL
SUM OF ALL RESPONSES TO PHQ9 QUESTIONS 1 & 2: 0
2. FEELING DOWN, DEPRESSED OR HOPELESS: NOT AT ALL
SUM OF ALL RESPONSES TO PHQ QUESTIONS 1-9: 0

## 2025-01-20 ASSESSMENT — ENCOUNTER SYMPTOMS
GASTROINTESTINAL NEGATIVE: 1
RESPIRATORY NEGATIVE: 1
ALLERGIC/IMMUNOLOGIC NEGATIVE: 1
EYES NEGATIVE: 1

## 2025-01-20 NOTE — PROGRESS NOTES
25  Name: Linda Feliz    : 1960    Sex: female    Age: 64 y.o.        Subjective:  Chief Complaint: Patient is here for DYSUREAI   FREQ     Onset      severawl  d  o  t s [facility] no  cp abd  bck          Review of Systems   Constitutional: Negative.    HENT: Negative.     Eyes: Negative.    Respiratory: Negative.     Cardiovascular: Negative.    Gastrointestinal: Negative.    Endocrine: Negative.    Genitourinary:  Positive for dysuria and frequency.   Musculoskeletal: Negative.    Skin: Negative.    Allergic/Immunologic: Negative.    Neurological: Negative.    Hematological: Negative.    Psychiatric/Behavioral: Negative.           Current Outpatient Medications:     nitrofurantoin, macrocrystal-monohydrate, (MACROBID) 100 MG capsule, Take 1 capsule by mouth 2 times daily, Disp: 14 capsule, Rfl: 0    zinc gluconate 50 MG tablet, Take 1 tablet by mouth daily, Disp: , Rfl:     Red Yeast Rice Extract (RED YEAST RICE PO), Take by mouth, Disp: , Rfl:     vitamin D (CHOLECALCIFEROL) 1000 UNITS TABS tablet, Take 1 tablet by mouth daily, Disp: , Rfl:   No Known Allergies  Social History     Socioeconomic History    Marital status:      Spouse name: Not on file    Number of children: Not on file    Years of education: Not on file    Highest education level: Not on file   Occupational History    Not on file   Tobacco Use    Smoking status: Never    Smokeless tobacco: Never   Vaping Use    Vaping status: Never Used   Substance and Sexual Activity    Alcohol use: Yes     Comment: socially    Drug use: No    Sexual activity: Not on file   Other Topics Concern    Not on file   Social History Narrative    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

## 2025-01-22 LAB
CULTURE: NO GROWTH
SPECIMEN DESCRIPTION: NORMAL

## 2025-01-27 ENCOUNTER — TELEPHONE (OUTPATIENT)
Dept: PRIMARY CARE CLINIC | Age: 65
End: 2025-01-27

## 2025-01-27 DIAGNOSIS — F41.9 ANXIETY: Primary | ICD-10-CM

## 2025-01-27 RX ORDER — LORAZEPAM 0.5 MG/1
TABLET ORAL
Qty: 4 TABLET | Refills: 0 | Status: SHIPPED | OUTPATIENT
Start: 2025-01-27 | End: 2025-02-20

## 2025-01-27 NOTE — TELEPHONE ENCOUNTER
The pt has to have some dental procedures done in February that she will have to go 4 times for, she is calling because she is so nervous and is asking if you can prescribe her something to take before going and if you can give her 4 so she has one for each appointment, she uses [facility] on [address]

## 2025-08-08 ENCOUNTER — APPOINTMENT (OUTPATIENT)
Dept: GENERAL RADIOLOGY | Age: 65
End: 2025-08-08
Payer: COMMERCIAL

## 2025-08-08 ENCOUNTER — HOSPITAL ENCOUNTER (OUTPATIENT)
Dept: GENERAL RADIOLOGY | Age: 65
Discharge: HOME | End: 2025-08-10
Payer: COMMERCIAL

## 2025-08-08 DIAGNOSIS — Z12.31 ENCOUNTER FOR SCREENING MAMMOGRAM FOR MALIGNANT NEOPLASM OF BREAST: ICD-10-CM

## 2025-08-08 PROCEDURE — 77067 SCR MAMMO BI INCL CAD: CPT

## (undated) DEVICE — BLOCK BITE 60FR RUBBER ADLT DENTAL

## (undated) DEVICE — SPONGE GZ W4XL4IN RAYON POLY FILL CVR W/ NONWOVEN FAB

## (undated) DEVICE — FORCEPS BX OVL CUP FEN DISPOSABLE CAP L 160CM RAD JAW 4

## (undated) DEVICE — GRADUATE TRIANG MEASURE 1000ML BLK PRNT